# Patient Record
Sex: FEMALE | Race: WHITE | NOT HISPANIC OR LATINO | Employment: OTHER | URBAN - METROPOLITAN AREA
[De-identification: names, ages, dates, MRNs, and addresses within clinical notes are randomized per-mention and may not be internally consistent; named-entity substitution may affect disease eponyms.]

---

## 2023-02-24 PROBLEM — F17.200 SMOKING: Status: ACTIVE | Noted: 2023-02-24

## 2023-02-24 PROBLEM — I44.7 LBBB (LEFT BUNDLE BRANCH BLOCK): Status: ACTIVE | Noted: 2023-02-24

## 2023-02-24 PROBLEM — I21.3 STEMI (ST ELEVATION MYOCARDIAL INFARCTION) (MULTI): Status: ACTIVE | Noted: 2023-02-24

## 2023-02-24 PROBLEM — R05.3 CHRONIC COUGH: Status: ACTIVE | Noted: 2023-02-24

## 2023-02-24 PROBLEM — I51.89 LEFT VENTRICULAR HYPOKINESIS: Status: ACTIVE | Noted: 2023-02-24

## 2023-02-24 PROBLEM — D69.6 THROMBOCYTOPENIA (CMS-HCC): Status: ACTIVE | Noted: 2023-02-24

## 2023-02-24 PROBLEM — R06.02 SHORTNESS OF BREATH: Status: ACTIVE | Noted: 2023-02-24

## 2023-02-24 PROBLEM — I31.39 PERICARDIAL EFFUSION (HHS-HCC): Status: ACTIVE | Noted: 2023-02-24

## 2023-02-24 PROBLEM — E78.5 DYSLIPIDEMIA: Status: ACTIVE | Noted: 2023-02-24

## 2023-02-24 PROBLEM — D72.829 LEUKOCYTOSIS: Status: ACTIVE | Noted: 2023-02-24

## 2023-02-24 PROBLEM — I25.10 CAD (CORONARY ARTERY DISEASE): Status: ACTIVE | Noted: 2023-02-24

## 2023-02-24 PROBLEM — Z95.5 H/O HEART ARTERY STENT: Status: ACTIVE | Noted: 2023-02-24

## 2023-02-24 PROBLEM — I42.0 CARDIOMYOPATHY, DILATED, NONISCHEMIC (MULTI): Status: ACTIVE | Noted: 2023-02-24

## 2023-02-24 PROBLEM — J44.1 COPD WITH ACUTE EXACERBATION (MULTI): Status: ACTIVE | Noted: 2023-02-24

## 2023-02-24 PROBLEM — R10.9 ABDOMINAL PAIN: Status: ACTIVE | Noted: 2023-02-24

## 2023-02-24 PROBLEM — I50.22 CHRONIC SYSTOLIC HEART FAILURE (MULTI): Status: ACTIVE | Noted: 2023-02-24

## 2023-02-24 PROBLEM — I07.1 MODERATE TRICUSPID REGURGITATION: Status: ACTIVE | Noted: 2023-02-24

## 2023-02-24 RX ORDER — MULTIVITAMIN
1 TABLET ORAL DAILY
COMMUNITY

## 2023-02-24 RX ORDER — NITROGLYCERIN 0.4 MG/1
0.4 TABLET SUBLINGUAL EVERY 5 MIN PRN
COMMUNITY
Start: 2016-07-01

## 2023-02-24 RX ORDER — CARVEDILOL 25 MG/1
50 TABLET ORAL 2 TIMES DAILY
COMMUNITY
Start: 2016-10-18

## 2023-02-24 RX ORDER — FUROSEMIDE 20 MG/1
20 TABLET ORAL DAILY PRN
COMMUNITY
End: 2024-03-19 | Stop reason: WASHOUT

## 2023-02-24 RX ORDER — PREDNISONE 20 MG/1
TABLET ORAL
COMMUNITY
End: 2023-04-20 | Stop reason: ALTCHOICE

## 2023-02-24 RX ORDER — ROSUVASTATIN CALCIUM 40 MG/1
1 TABLET, COATED ORAL NIGHTLY
COMMUNITY
Start: 2017-08-09 | End: 2023-06-23 | Stop reason: SDUPTHER

## 2023-02-24 RX ORDER — METFORMIN HYDROCHLORIDE 500 MG/1
500 TABLET ORAL EVERY 12 HOURS
COMMUNITY
Start: 2020-12-04 | End: 2023-10-26 | Stop reason: ALTCHOICE

## 2023-02-24 RX ORDER — FENOFIBRATE 160 MG/1
1 TABLET ORAL DAILY
COMMUNITY
Start: 2022-05-01 | End: 2023-06-23 | Stop reason: SDUPTHER

## 2023-02-24 RX ORDER — ALBUTEROL SULFATE 90 UG/1
1 AEROSOL, METERED RESPIRATORY (INHALATION) EVERY 4 HOURS PRN
COMMUNITY
Start: 2015-11-06

## 2023-02-24 RX ORDER — LISINOPRIL 40 MG/1
1 TABLET ORAL DAILY
COMMUNITY
Start: 2017-01-25

## 2023-02-24 RX ORDER — ASPIRIN 81 MG/1
1 TABLET ORAL DAILY
COMMUNITY
Start: 2016-05-21

## 2023-02-24 RX ORDER — OXYBUTYNIN CHLORIDE 5 MG/1
TABLET ORAL
COMMUNITY
End: 2023-04-20

## 2023-02-24 RX ORDER — PANTOPRAZOLE SODIUM 40 MG/1
40 TABLET, DELAYED RELEASE ORAL DAILY
COMMUNITY
Start: 2016-05-21 | End: 2023-04-24 | Stop reason: SDUPTHER

## 2023-04-20 ENCOUNTER — OFFICE VISIT (OUTPATIENT)
Dept: PRIMARY CARE | Facility: CLINIC | Age: 63
End: 2023-04-20
Payer: COMMERCIAL

## 2023-04-20 VITALS
HEIGHT: 66 IN | HEART RATE: 65 BPM | SYSTOLIC BLOOD PRESSURE: 124 MMHG | RESPIRATION RATE: 16 BRPM | BODY MASS INDEX: 28.64 KG/M2 | OXYGEN SATURATION: 96 % | DIASTOLIC BLOOD PRESSURE: 76 MMHG | WEIGHT: 178.2 LBS

## 2023-04-20 DIAGNOSIS — R73.03 PREDIABETES: ICD-10-CM

## 2023-04-20 DIAGNOSIS — L98.9 CHANGING SKIN LESION: ICD-10-CM

## 2023-04-20 DIAGNOSIS — D69.6 THROMBOCYTOPENIA (CMS-HCC): ICD-10-CM

## 2023-04-20 DIAGNOSIS — Z12.31 VISIT FOR SCREENING MAMMOGRAM: ICD-10-CM

## 2023-04-20 DIAGNOSIS — Z23 NEED FOR PNEUMOCOCCAL VACCINE: ICD-10-CM

## 2023-04-20 DIAGNOSIS — Z12.11 COLON CANCER SCREENING: Primary | ICD-10-CM

## 2023-04-20 DIAGNOSIS — J44.1 COPD WITH ACUTE EXACERBATION (MULTI): ICD-10-CM

## 2023-04-20 DIAGNOSIS — I42.0 CARDIOMYOPATHY, DILATED, NONISCHEMIC (MULTI): ICD-10-CM

## 2023-04-20 DIAGNOSIS — I50.22 CHRONIC SYSTOLIC HEART FAILURE (MULTI): ICD-10-CM

## 2023-04-20 DIAGNOSIS — I25.10 CORONARY ARTERY DISEASE, UNSPECIFIED VESSEL OR LESION TYPE, UNSPECIFIED WHETHER ANGINA PRESENT, UNSPECIFIED WHETHER NATIVE OR TRANSPLANTED HEART: ICD-10-CM

## 2023-04-20 DIAGNOSIS — Z76.89 ENCOUNTER TO ESTABLISH CARE: ICD-10-CM

## 2023-04-20 DIAGNOSIS — I44.7 LBBB (LEFT BUNDLE BRANCH BLOCK): ICD-10-CM

## 2023-04-20 PROCEDURE — 99204 OFFICE O/P NEW MOD 45 MIN: CPT | Performed by: CLINICAL NURSE SPECIALIST

## 2023-04-20 PROCEDURE — 1160F RVW MEDS BY RX/DR IN RCRD: CPT | Performed by: CLINICAL NURSE SPECIALIST

## 2023-04-20 PROCEDURE — 90677 PCV20 VACCINE IM: CPT | Performed by: CLINICAL NURSE SPECIALIST

## 2023-04-20 PROCEDURE — G0009 ADMIN PNEUMOCOCCAL VACCINE: HCPCS | Performed by: CLINICAL NURSE SPECIALIST

## 2023-04-20 PROCEDURE — 1159F MED LIST DOCD IN RCRD: CPT | Performed by: CLINICAL NURSE SPECIALIST

## 2023-04-20 PROCEDURE — 1036F TOBACCO NON-USER: CPT | Performed by: CLINICAL NURSE SPECIALIST

## 2023-04-20 RX ORDER — LANCETS
1 EACH MISCELLANEOUS DAILY
Qty: 100 EACH | Refills: 3 | Status: SHIPPED | OUTPATIENT
Start: 2023-04-20

## 2023-04-20 RX ORDER — DEXTROSE 4 G
1 TABLET,CHEWABLE ORAL DAILY
Qty: 1 EACH | Refills: 0 | Status: SHIPPED | OUTPATIENT
Start: 2023-04-20

## 2023-04-20 ASSESSMENT — ENCOUNTER SYMPTOMS
POLYDIPSIA: 0
SORE THROAT: 0
ABDOMINAL PAIN: 0
BACK PAIN: 0
JOINT SWELLING: 0
TROUBLE SWALLOWING: 0
DYSURIA: 0
CONFUSION: 0
APPETITE CHANGE: 0
ACTIVITY CHANGE: 0
MYALGIAS: 0
HEMATURIA: 0
OCCASIONAL FEELINGS OF UNSTEADINESS: 0
FATIGUE: 0
COUGH: 0
WOUND: 0
DEPRESSION: 0
FEVER: 0
SEIZURES: 0
FLANK PAIN: 0
BRUISES/BLEEDS EASILY: 0
SHORTNESS OF BREATH: 0
DIARRHEA: 0
DIZZINESS: 0
EYE PAIN: 0
NECK PAIN: 0
CONSTIPATION: 0
NAUSEA: 0
CHILLS: 0
CHEST TIGHTNESS: 0
BLOOD IN STOOL: 0
PALPITATIONS: 0
LOSS OF SENSATION IN FEET: 0
HEADACHES: 0
WHEEZING: 0
SLEEP DISTURBANCE: 0
PHOTOPHOBIA: 0
UNEXPECTED WEIGHT CHANGE: 0
VOMITING: 0
ARTHRALGIAS: 0

## 2023-04-20 ASSESSMENT — ANXIETY QUESTIONNAIRES
IF YOU CHECKED OFF ANY PROBLEMS ON THIS QUESTIONNAIRE, HOW DIFFICULT HAVE THESE PROBLEMS MADE IT FOR YOU TO DO YOUR WORK, TAKE CARE OF THINGS AT HOME, OR GET ALONG WITH OTHER PEOPLE: NOT DIFFICULT AT ALL
7. FEELING AFRAID AS IF SOMETHING AWFUL MIGHT HAPPEN: NOT AT ALL
6. BECOMING EASILY ANNOYED OR IRRITABLE: NOT AT ALL
1. FEELING NERVOUS, ANXIOUS, OR ON EDGE: NOT AT ALL
2. NOT BEING ABLE TO STOP OR CONTROL WORRYING: NOT AT ALL
4. TROUBLE RELAXING: NOT AT ALL
GAD7 TOTAL SCORE: 0
5. BEING SO RESTLESS THAT IT IS HARD TO SIT STILL: NOT AT ALL
3. WORRYING TOO MUCH ABOUT DIFFERENT THINGS: NOT AT ALL

## 2023-04-20 ASSESSMENT — PATIENT HEALTH QUESTIONNAIRE - PHQ9
2. FEELING DOWN, DEPRESSED OR HOPELESS: NOT AT ALL
SUM OF ALL RESPONSES TO PHQ9 QUESTIONS 1 AND 2: 0
1. LITTLE INTEREST OR PLEASURE IN DOING THINGS: NOT AT ALL

## 2023-04-20 NOTE — PROGRESS NOTES
Subjective   Patient ID: Sophie Cunningham is a 63 y.o. female who presents for Establish Care.  HPI    New patient here today to establish care.  Previously followed with [facility] in 2018.     Following with Dr. Franklin for Cardiology. [facility]. MI in 2016 with Stent placement. Dyslipidemia, has been taking Simvastatin. Stopped Fenofibrate. Planning to restart.     COPD. Albuterol PRN with Nebulizer solution. Respiratory status at baseline.     Has been diagnosed with Prediabetes. Previously was Metformin, not comfortable with taking medication. Has not been monitoring blood sugars. Does not have the equipment at home.       Review of Systems   Constitutional:  Negative for activity change, appetite change, chills, fatigue, fever and unexpected weight change.   HENT:  Negative for ear pain, hearing loss, nosebleeds, sore throat, tinnitus and trouble swallowing.    Eyes:  Negative for photophobia, pain and visual disturbance.   Respiratory:  Negative for cough, chest tightness, shortness of breath and wheezing.    Cardiovascular:  Negative for chest pain, palpitations and leg swelling.   Gastrointestinal:  Negative for abdominal pain, blood in stool, constipation, diarrhea, nausea and vomiting.   Endocrine: Negative for cold intolerance, heat intolerance, polydipsia and polyuria.   Genitourinary:  Negative for dysuria, flank pain and hematuria.   Musculoskeletal:  Negative for arthralgias, back pain, joint swelling, myalgias and neck pain.   Skin:  Negative for pallor, rash and wound.   Allergic/Immunologic: Negative for immunocompromised state.   Neurological:  Negative for dizziness, seizures and headaches.   Hematological:  Does not bruise/bleed easily.   Psychiatric/Behavioral:  Negative for confusion and sleep disturbance.        Objective   Physical Exam  Vitals and nursing note reviewed.   Constitutional:       General: She is not in acute distress.     Appearance: Normal appearance.    HENT:      Head: Normocephalic.      Nose: Nose normal.   Eyes:      Conjunctiva/sclera: Conjunctivae normal.   Neck:      Vascular: No carotid bruit.   Cardiovascular:      Rate and Rhythm: Normal rate and regular rhythm.      Pulses: Normal pulses.      Heart sounds: Normal heart sounds.   Pulmonary:      Effort: Pulmonary effort is normal.      Breath sounds: Normal breath sounds.   Abdominal:      General: Bowel sounds are normal.      Palpations: Abdomen is soft.   Musculoskeletal:         General: Normal range of motion.      Cervical back: Normal range of motion.   Skin:     General: Skin is warm and dry.   Neurological:      Mental Status: She is alert and oriented to person, place, and time. Mental status is at baseline.   Psychiatric:         Mood and Affect: Mood normal.         Behavior: Behavior normal.         Assessment/Plan        Cardiomyopathy, CAD, Heart Failure, LBBB: Following with Cardiology for management. Stable at this time. Continue to monitor.   Changing Skin Lesion: Dermatology referral.   Dyslipidemia: Continue medication as prescribed.   Thrombocytopenia: Updated lab work ordered.   Prediabetes: Updated A1C ordered.   COPD: Respiratory status at baseline. Continue Albuterol.     Due for Medicare Wellness at follow up appointment.   Declined Colon Cancer Screening.   Mammogram ordered.   Prevnar: April 2023.

## 2023-04-24 DIAGNOSIS — R10.84 GENERALIZED ABDOMINAL PAIN: Primary | ICD-10-CM

## 2023-04-24 RX ORDER — PANTOPRAZOLE SODIUM 40 MG/1
40 TABLET, DELAYED RELEASE ORAL
Qty: 30 TABLET | Refills: 2 | Status: SHIPPED | OUTPATIENT
Start: 2023-04-24 | End: 2023-08-07 | Stop reason: SDUPTHER

## 2023-04-24 NOTE — TELEPHONE ENCOUNTER
Patient called in stating she spoke to her pharmacy and this medication pantoprazole was not sent in I repended it to you.    Silas archibald

## 2023-05-08 ENCOUNTER — LAB (OUTPATIENT)
Dept: LAB | Facility: LAB | Age: 63
End: 2023-05-08
Payer: COMMERCIAL

## 2023-05-08 DIAGNOSIS — L98.9 CHANGING SKIN LESION: ICD-10-CM

## 2023-05-08 DIAGNOSIS — J44.1 COPD WITH ACUTE EXACERBATION (MULTI): ICD-10-CM

## 2023-05-08 DIAGNOSIS — Z12.31 VISIT FOR SCREENING MAMMOGRAM: ICD-10-CM

## 2023-05-08 DIAGNOSIS — Z12.11 COLON CANCER SCREENING: ICD-10-CM

## 2023-05-08 DIAGNOSIS — I50.22 CHRONIC SYSTOLIC HEART FAILURE (MULTI): ICD-10-CM

## 2023-05-08 DIAGNOSIS — I25.10 CORONARY ARTERY DISEASE, UNSPECIFIED VESSEL OR LESION TYPE, UNSPECIFIED WHETHER ANGINA PRESENT, UNSPECIFIED WHETHER NATIVE OR TRANSPLANTED HEART: ICD-10-CM

## 2023-05-08 DIAGNOSIS — R73.03 PREDIABETES: ICD-10-CM

## 2023-05-08 DIAGNOSIS — D69.6 THROMBOCYTOPENIA (CMS-HCC): ICD-10-CM

## 2023-05-08 DIAGNOSIS — I42.0 CARDIOMYOPATHY, DILATED, NONISCHEMIC (MULTI): ICD-10-CM

## 2023-05-08 LAB
ALANINE AMINOTRANSFERASE (SGPT) (U/L) IN SER/PLAS: 17 U/L (ref 7–45)
ALANINE AMINOTRANSFERASE (SGPT) (U/L) IN SER/PLAS: 17 U/L (ref 7–45)
ALBUMIN (G/DL) IN SER/PLAS: 3.8 G/DL (ref 3.4–5)
ALBUMIN (G/DL) IN SER/PLAS: 3.8 G/DL (ref 3.4–5)
ALKALINE PHOSPHATASE (U/L) IN SER/PLAS: 43 U/L (ref 33–136)
ALKALINE PHOSPHATASE (U/L) IN SER/PLAS: 43 U/L (ref 33–136)
ANION GAP IN SER/PLAS: 9 MMOL/L (ref 10–20)
ANION GAP IN SER/PLAS: 9 MMOL/L (ref 10–20)
ASPARTATE AMINOTRANSFERASE (SGOT) (U/L) IN SER/PLAS: 18 U/L (ref 9–39)
ASPARTATE AMINOTRANSFERASE (SGOT) (U/L) IN SER/PLAS: 18 U/L (ref 9–39)
BILIRUBIN TOTAL (MG/DL) IN SER/PLAS: 0.7 MG/DL (ref 0–1.2)
BILIRUBIN TOTAL (MG/DL) IN SER/PLAS: 0.7 MG/DL (ref 0–1.2)
CALCIUM (MG/DL) IN SER/PLAS: 9 MG/DL (ref 8.6–10.3)
CALCIUM (MG/DL) IN SER/PLAS: 9.2 MG/DL (ref 8.6–10.3)
CARBON DIOXIDE, TOTAL (MMOL/L) IN SER/PLAS: 28 MMOL/L (ref 21–32)
CARBON DIOXIDE, TOTAL (MMOL/L) IN SER/PLAS: 28 MMOL/L (ref 21–32)
CHLORIDE (MMOL/L) IN SER/PLAS: 111 MMOL/L (ref 98–107)
CHLORIDE (MMOL/L) IN SER/PLAS: 112 MMOL/L (ref 98–107)
CHOLESTEROL (MG/DL) IN SER/PLAS: 212 MG/DL (ref 0–199)
CHOLESTEROL IN HDL (MG/DL) IN SER/PLAS: 37.5 MG/DL
CHOLESTEROL/HDL RATIO: 5.7
COBALAMIN (VITAMIN B12) (PG/ML) IN SER/PLAS: 176 PG/ML (ref 211–911)
CREATININE (MG/DL) IN SER/PLAS: 0.8 MG/DL (ref 0.5–1.05)
CREATININE (MG/DL) IN SER/PLAS: 0.81 MG/DL (ref 0.5–1.05)
ERYTHROCYTE DISTRIBUTION WIDTH (RATIO) BY AUTOMATED COUNT: 13.9 % (ref 11.5–14.5)
ERYTHROCYTE MEAN CORPUSCULAR HEMOGLOBIN CONCENTRATION (G/DL) BY AUTOMATED: 31.8 G/DL (ref 32–36)
ERYTHROCYTE MEAN CORPUSCULAR VOLUME (FL) BY AUTOMATED COUNT: 93 FL (ref 80–100)
ERYTHROCYTES (10*6/UL) IN BLOOD BY AUTOMATED COUNT: 4.33 X10E12/L (ref 4–5.2)
ESTIMATED AVERAGE GLUCOSE FOR HBA1C: 143 MG/DL
GFR FEMALE: 80 ML/MIN/1.73M2
GFR FEMALE: 82 ML/MIN/1.73M2
GLUCOSE (MG/DL) IN SER/PLAS: 95 MG/DL (ref 74–99)
GLUCOSE (MG/DL) IN SER/PLAS: 96 MG/DL (ref 74–99)
HEMATOCRIT (%) IN BLOOD BY AUTOMATED COUNT: 40.2 % (ref 36–46)
HEMOGLOBIN (G/DL) IN BLOOD: 12.8 G/DL (ref 12–16)
HEMOGLOBIN A1C/HEMOGLOBIN TOTAL IN BLOOD: 6.6 %
LDL: 137 MG/DL (ref 0–99)
LEUKOCYTES (10*3/UL) IN BLOOD BY AUTOMATED COUNT: 9.3 X10E9/L (ref 4.4–11.3)
PLATELETS (10*3/UL) IN BLOOD AUTOMATED COUNT: 168 X10E9/L (ref 150–450)
POTASSIUM (MMOL/L) IN SER/PLAS: 3.9 MMOL/L (ref 3.5–5.3)
POTASSIUM (MMOL/L) IN SER/PLAS: 4.3 MMOL/L (ref 3.5–5.3)
PROTEIN TOTAL: 6 G/DL (ref 6.4–8.2)
PROTEIN TOTAL: 6.3 G/DL (ref 6.4–8.2)
SODIUM (MMOL/L) IN SER/PLAS: 144 MMOL/L (ref 136–145)
SODIUM (MMOL/L) IN SER/PLAS: 145 MMOL/L (ref 136–145)
THYROTROPIN (MIU/L) IN SER/PLAS BY DETECTION LIMIT <= 0.05 MIU/L: 1.19 MIU/L (ref 0.44–3.98)
TRIGLYCERIDE (MG/DL) IN SER/PLAS: 189 MG/DL (ref 0–149)
UREA NITROGEN (MG/DL) IN SER/PLAS: 12 MG/DL (ref 6–23)
UREA NITROGEN (MG/DL) IN SER/PLAS: 12 MG/DL (ref 6–23)
VLDL: 38 MG/DL (ref 0–40)

## 2023-05-08 PROCEDURE — 85027 COMPLETE CBC AUTOMATED: CPT

## 2023-05-08 PROCEDURE — 36415 COLL VENOUS BLD VENIPUNCTURE: CPT

## 2023-05-08 PROCEDURE — 84443 ASSAY THYROID STIM HORMONE: CPT

## 2023-05-08 PROCEDURE — 80053 COMPREHEN METABOLIC PANEL: CPT

## 2023-05-08 PROCEDURE — 82607 VITAMIN B-12: CPT

## 2023-05-08 PROCEDURE — 83036 HEMOGLOBIN GLYCOSYLATED A1C: CPT

## 2023-05-09 ENCOUNTER — TELEPHONE (OUTPATIENT)
Dept: PRIMARY CARE | Facility: CLINIC | Age: 63
End: 2023-05-09
Payer: MEDICAID

## 2023-05-09 DIAGNOSIS — E11.9 TYPE 2 DIABETES MELLITUS WITHOUT COMPLICATION, WITHOUT LONG-TERM CURRENT USE OF INSULIN (MULTI): ICD-10-CM

## 2023-05-09 DIAGNOSIS — E53.8 VITAMIN B12 DEFICIENCY: ICD-10-CM

## 2023-05-09 NOTE — TELEPHONE ENCOUNTER
----- Message from SUNI Soto-CNS sent at 5/9/2023 10:24 AM EDT -----  Please let patient know that her A1C is 6.6%. Indicates Diabetes, diet controlled. Vitamin B12 is low, recommend Vitamin B12. Repeat CMP, A1C, Vitamin B12 with an OV in 4 months. Thank you!

## 2023-06-23 ENCOUNTER — TELEPHONE (OUTPATIENT)
Dept: PRIMARY CARE | Facility: CLINIC | Age: 63
End: 2023-06-23
Payer: MEDICAID

## 2023-06-23 DIAGNOSIS — E78.5 DYSLIPIDEMIA: ICD-10-CM

## 2023-06-23 RX ORDER — FENOFIBRATE 160 MG/1
160 TABLET ORAL DAILY
Qty: 90 TABLET | Refills: 1 | Status: SHIPPED | OUTPATIENT
Start: 2023-06-23 | End: 2024-04-25 | Stop reason: ALTCHOICE

## 2023-06-23 RX ORDER — ROSUVASTATIN CALCIUM 40 MG/1
40 TABLET, COATED ORAL NIGHTLY
Qty: 90 TABLET | Refills: 1 | Status: SHIPPED | OUTPATIENT
Start: 2023-06-23 | End: 2024-04-25 | Stop reason: SDUPTHER

## 2023-06-23 NOTE — TELEPHONE ENCOUNTER
Refill on Fenofibrate 160 mg  1 tablet daily & Rosuvastatin 40 mg  1 tablet daily    #90  on each to Silas Phillips

## 2023-08-07 DIAGNOSIS — R10.84 GENERALIZED ABDOMINAL PAIN: ICD-10-CM

## 2023-08-07 RX ORDER — PANTOPRAZOLE SODIUM 40 MG/1
40 TABLET, DELAYED RELEASE ORAL
Qty: 90 TABLET | Refills: 1 | Status: SHIPPED | OUTPATIENT
Start: 2023-08-07 | End: 2024-02-12 | Stop reason: SDUPTHER

## 2023-09-13 ENCOUNTER — APPOINTMENT (OUTPATIENT)
Dept: PRIMARY CARE | Facility: CLINIC | Age: 63
End: 2023-09-13
Payer: MEDICAID

## 2023-09-13 ENCOUNTER — TELEPHONE (OUTPATIENT)
Dept: PRIMARY CARE | Facility: CLINIC | Age: 63
End: 2023-09-13

## 2023-09-13 NOTE — TELEPHONE ENCOUNTER
Patient canceled todays appt due to her daughter having surgery , she has an appt in October  for MCR wellness exam Do you need to see her earlier than that, Please advise  Please call patient back [phone]

## 2023-10-26 ENCOUNTER — OFFICE VISIT (OUTPATIENT)
Dept: PRIMARY CARE | Facility: CLINIC | Age: 63
End: 2023-10-26
Payer: COMMERCIAL

## 2023-10-26 VITALS
DIASTOLIC BLOOD PRESSURE: 68 MMHG | SYSTOLIC BLOOD PRESSURE: 110 MMHG | WEIGHT: 181 LBS | HEART RATE: 71 BPM | HEIGHT: 66 IN | BODY MASS INDEX: 29.09 KG/M2

## 2023-10-26 DIAGNOSIS — Z00.00 MEDICARE ANNUAL WELLNESS VISIT, SUBSEQUENT: Primary | ICD-10-CM

## 2023-10-26 DIAGNOSIS — I25.10 CORONARY ARTERY DISEASE, UNSPECIFIED VESSEL OR LESION TYPE, UNSPECIFIED WHETHER ANGINA PRESENT, UNSPECIFIED WHETHER NATIVE OR TRANSPLANTED HEART: ICD-10-CM

## 2023-10-26 DIAGNOSIS — Z12.31 VISIT FOR SCREENING MAMMOGRAM: ICD-10-CM

## 2023-10-26 DIAGNOSIS — Z12.11 COLON CANCER SCREENING: ICD-10-CM

## 2023-10-26 DIAGNOSIS — J44.9 CHRONIC OBSTRUCTIVE PULMONARY DISEASE, UNSPECIFIED COPD TYPE (MULTI): ICD-10-CM

## 2023-10-26 DIAGNOSIS — D69.6 THROMBOCYTOPENIA (CMS-HCC): ICD-10-CM

## 2023-10-26 DIAGNOSIS — R73.03 PREDIABETES: ICD-10-CM

## 2023-10-26 DIAGNOSIS — I50.22 CHRONIC SYSTOLIC HEART FAILURE (MULTI): ICD-10-CM

## 2023-10-26 DIAGNOSIS — E11.9 DIABETES MELLITUS WITHOUT COMPLICATION (MULTI): ICD-10-CM

## 2023-10-26 DIAGNOSIS — Z78.0 POST-MENOPAUSAL: ICD-10-CM

## 2023-10-26 DIAGNOSIS — I42.0 CARDIOMYOPATHY, DILATED, NONISCHEMIC (MULTI): ICD-10-CM

## 2023-10-26 DIAGNOSIS — L98.9 CHANGING SKIN LESION: ICD-10-CM

## 2023-10-26 PROBLEM — L57.0 ACTINIC KERATOSIS: Status: ACTIVE | Noted: 2020-12-10

## 2023-10-26 PROBLEM — L85.3 XEROSIS CUTIS: Status: ACTIVE | Noted: 2020-12-10

## 2023-10-26 PROBLEM — I25.2 HISTORY OF ST ELEVATION MYOCARDIAL INFARCTION (STEMI): Status: ACTIVE | Noted: 2023-10-26

## 2023-10-26 PROBLEM — D18.01 HEMANGIOMA OF SKIN AND SUBCUTANEOUS TISSUE: Status: ACTIVE | Noted: 2020-12-10

## 2023-10-26 PROBLEM — L82.1 OTHER SEBORRHEIC KERATOSIS: Status: ACTIVE | Noted: 2020-12-10

## 2023-10-26 PROBLEM — C44.41 BASAL CELL CARCINOMA OF SKIN OF SCALP AND NECK: Status: ACTIVE | Noted: 2020-12-10

## 2023-10-26 PROBLEM — C44.612 BASAL CELL CARCINOMA OF SKIN OF RIGHT UPPER LIMB, INCLUDING SHOULDER: Status: ACTIVE | Noted: 2020-12-10

## 2023-10-26 PROBLEM — L57.9 SKIN CHANGES DUE TO CHRONIC EXPOSURE TO NONIONIZING RADIATION, UNSPECIFIED: Status: ACTIVE | Noted: 2020-12-10

## 2023-10-26 PROBLEM — I34.0 MITRAL REGURGITATION: Status: ACTIVE | Noted: 2023-10-26

## 2023-10-26 PROBLEM — D48.5 NEOPLASM OF UNCERTAIN BEHAVIOR OF SKIN: Status: ACTIVE | Noted: 2020-12-10

## 2023-10-26 PROCEDURE — 1036F TOBACCO NON-USER: CPT | Performed by: CLINICAL NURSE SPECIALIST

## 2023-10-26 PROCEDURE — 3074F SYST BP LT 130 MM HG: CPT | Performed by: CLINICAL NURSE SPECIALIST

## 2023-10-26 PROCEDURE — 4010F ACE/ARB THERAPY RXD/TAKEN: CPT | Performed by: CLINICAL NURSE SPECIALIST

## 2023-10-26 PROCEDURE — 1159F MED LIST DOCD IN RCRD: CPT | Performed by: CLINICAL NURSE SPECIALIST

## 2023-10-26 PROCEDURE — 3078F DIAST BP <80 MM HG: CPT | Performed by: CLINICAL NURSE SPECIALIST

## 2023-10-26 PROCEDURE — G0444 DEPRESSION SCREEN ANNUAL: HCPCS | Performed by: CLINICAL NURSE SPECIALIST

## 2023-10-26 PROCEDURE — 1170F FXNL STATUS ASSESSED: CPT | Performed by: CLINICAL NURSE SPECIALIST

## 2023-10-26 PROCEDURE — 1160F RVW MEDS BY RX/DR IN RCRD: CPT | Performed by: CLINICAL NURSE SPECIALIST

## 2023-10-26 PROCEDURE — 3044F HG A1C LEVEL LT 7.0%: CPT | Performed by: CLINICAL NURSE SPECIALIST

## 2023-10-26 PROCEDURE — G0439 PPPS, SUBSEQ VISIT: HCPCS | Performed by: CLINICAL NURSE SPECIALIST

## 2023-10-26 PROCEDURE — 99214 OFFICE O/P EST MOD 30 MIN: CPT | Performed by: CLINICAL NURSE SPECIALIST

## 2023-10-26 ASSESSMENT — ENCOUNTER SYMPTOMS
BACK PAIN: 0
CONSTIPATION: 0
JOINT SWELLING: 0
ABDOMINAL PAIN: 0
MYALGIAS: 0
WHEEZING: 0
NECK PAIN: 0
WOUND: 0
PHOTOPHOBIA: 0
FLANK PAIN: 0
OCCASIONAL FEELINGS OF UNSTEADINESS: 0
UNEXPECTED WEIGHT CHANGE: 0
TROUBLE SWALLOWING: 0
PALPITATIONS: 0
LOSS OF SENSATION IN FEET: 0
BLOOD IN STOOL: 0
SEIZURES: 0
FEVER: 0
HEMATURIA: 0
SLEEP DISTURBANCE: 0
DIZZINESS: 0
ARTHRALGIAS: 0
FATIGUE: 0
CONFUSION: 0
ACTIVITY CHANGE: 0
CHEST TIGHTNESS: 0
NAUSEA: 0
VOMITING: 0
EYE PAIN: 0
SHORTNESS OF BREATH: 0
SORE THROAT: 0
BRUISES/BLEEDS EASILY: 0
CHILLS: 0
COUGH: 0
HEADACHES: 0
DEPRESSION: 0
DYSURIA: 0
POLYDIPSIA: 0
APPETITE CHANGE: 0
DIARRHEA: 0

## 2023-10-26 ASSESSMENT — COLUMBIA-SUICIDE SEVERITY RATING SCALE - C-SSRS
1. IN THE PAST MONTH, HAVE YOU WISHED YOU WERE DEAD OR WISHED YOU COULD GO TO SLEEP AND NOT WAKE UP?: NO
6. HAVE YOU EVER DONE ANYTHING, STARTED TO DO ANYTHING, OR PREPARED TO DO ANYTHING TO END YOUR LIFE?: NO
2. HAVE YOU ACTUALLY HAD ANY THOUGHTS OF KILLING YOURSELF?: NO

## 2023-10-26 ASSESSMENT — PATIENT HEALTH QUESTIONNAIRE - PHQ9
SUM OF ALL RESPONSES TO PHQ9 QUESTIONS 1 AND 2: 0
2. FEELING DOWN, DEPRESSED OR HOPELESS: NOT AT ALL
1. LITTLE INTEREST OR PLEASURE IN DOING THINGS: NOT AT ALL

## 2023-10-26 ASSESSMENT — ACTIVITIES OF DAILY LIVING (ADL)
MANAGING_FINANCES: INDEPENDENT
BATHING: INDEPENDENT
GROCERY_SHOPPING: INDEPENDENT
DRESSING: INDEPENDENT
TAKING_MEDICATION: INDEPENDENT
DOING_HOUSEWORK: INDEPENDENT

## 2023-10-26 NOTE — PROGRESS NOTES
Subjective   Reason for Visit: Sophie Cunningham is an 63 y.o. female here for a Medicare Wellness visit.     Past Medical, Surgical, and Family History reviewed and updated in chart.    Reviewed all medications by prescribing practitioner or clinical pharmacist (such as prescriptions, OTCs, herbal therapies and supplements) and documented in the medical record.    HPI    Here today as a follow up appointment. Due for Medicare Wellness.      Following with Dr. Franklin for Cardiology. [facility]. MI in 2016 with Stent placement. Dyslipidemia, has been taking Simvastatin and Fenofibrate.      COPD. Albuterol PRN with Nebulizer solution. Respiratory status at baseline.      Has been diagnosed with Diabetes. Previously was Metformin, not comfortable with taking medication. Has been monitoring blood sugars. 30 day average 112. 90 day average 108.        Patient Care Team:  JAMEL Soto as PCP - General (Internal Medicine)  JAMEL Soto as PCP - [employer] PCP     Review of Systems   Constitutional:  Negative for activity change, appetite change, chills, fatigue, fever and unexpected weight change.   HENT:  Negative for ear pain, hearing loss, nosebleeds, sore throat, tinnitus and trouble swallowing.    Eyes:  Negative for photophobia, pain and visual disturbance.   Respiratory:  Negative for cough, chest tightness, shortness of breath and wheezing.    Cardiovascular:  Negative for chest pain, palpitations and leg swelling.   Gastrointestinal:  Negative for abdominal pain, blood in stool, constipation, diarrhea, nausea and vomiting.   Endocrine: Negative for cold intolerance, heat intolerance, polydipsia and polyuria.   Genitourinary:  Negative for dysuria, flank pain and hematuria.   Musculoskeletal:  Negative for arthralgias, back pain, joint swelling, myalgias and neck pain.   Skin:  Negative for pallor, rash and wound.   Allergic/Immunologic: Negative for  "immunocompromised state.   Neurological:  Negative for dizziness, seizures and headaches.   Hematological:  Does not bruise/bleed easily.   Psychiatric/Behavioral:  Negative for confusion and sleep disturbance.        Objective   Vitals:  BP 80/54 (BP Location: Right arm, Patient Position: Sitting)   Pulse 71   Ht 1.664 m (5' 5.5\")   Wt 82.1 kg (181 lb)   BMI 29.66 kg/m²       Physical Exam  Vitals and nursing note reviewed.   Constitutional:       General: She is not in acute distress.     Appearance: Normal appearance.   HENT:      Head: Normocephalic.      Nose: Nose normal.   Eyes:      Conjunctiva/sclera: Conjunctivae normal.   Neck:      Vascular: No carotid bruit.   Cardiovascular:      Rate and Rhythm: Normal rate and regular rhythm.      Pulses: Normal pulses.      Heart sounds: Normal heart sounds.   Pulmonary:      Effort: Pulmonary effort is normal.      Breath sounds: Normal breath sounds.   Abdominal:      General: Bowel sounds are normal.      Palpations: Abdomen is soft.   Musculoskeletal:         General: Normal range of motion.      Cervical back: Normal range of motion.   Skin:     General: Skin is warm and dry.   Neurological:      Mental Status: She is alert and oriented to person, place, and time. Mental status is at baseline.   Psychiatric:         Mood and Affect: Mood normal.         Behavior: Behavior normal.       Assessment/Plan   Problem List Items Addressed This Visit       CAD (coronary artery disease)    Cardiomyopathy, dilated, nonischemic (CMS/HCC)    Chronic systolic heart failure (CMS/HCC)    COPD with acute exacerbation (CMS/HCC)    Thrombocytopenia (CMS/HCC)    Prediabetes     Other Visit Diagnoses       Colon cancer screening        Visit for screening mammogram        Changing skin lesion            Reviewed most recent results with patient.       Cardiomyopathy, CAD, Heart Failure, LBBB: Following with Cardiology for management. Stable at this time. Continue to monitor. "   Changing Skin Lesion: Dermatology referral. Has not followed up at this time.   Dyslipidemia: Continue medication as prescribed.   Thrombocytopenia: Updated lab work ordered.   Diabetes: A1C 6.6%. Discussed continued lifestyle modifications. Encourage updated eye exams and Podiatry.   COPD: Respiratory status at baseline. Continue Albuterol.   Medicare Wellness: Routine and age appropriate recommendations discussed with the patient today and patient verbalized understanding of the recommendations.  Questions answered.  Age appropriate immunizations and preventative screenings discussed with the patient and ordered as appropriate. Labs updated and ordered as indicated. Recommend healthy diet and daily exercise to maintain healthy body weight.      Medicare Wellness: October 2023.   Declined Colon Cancer Screening.   Mammogram ordered.   Prevnar: April 2023.   Declined Flu Vaccine.

## 2024-01-11 ENCOUNTER — HOSPITAL ENCOUNTER (OUTPATIENT)
Dept: CARDIOLOGY | Facility: HOSPITAL | Age: 64
Discharge: HOME | End: 2024-01-11
Payer: MEDICARE

## 2024-01-11 DIAGNOSIS — I42.0 DILATED CARDIOMYOPATHY (MULTI): ICD-10-CM

## 2024-01-11 DIAGNOSIS — Z95.810 PRESENCE OF AUTOMATIC (IMPLANTABLE) CARDIAC DEFIBRILLATOR: ICD-10-CM

## 2024-01-11 DIAGNOSIS — Z95.810 PRESENCE OF AUTOMATIC (IMPLANTABLE) CARDIAC DEFIBRILLATOR: Primary | ICD-10-CM

## 2024-01-11 PROCEDURE — 93284 PRGRMG EVAL IMPLANTABLE DFB: CPT

## 2024-01-11 PROCEDURE — 93290 INTERROG DEV EVAL ICPMS IP: CPT | Performed by: INTERNAL MEDICINE

## 2024-01-11 PROCEDURE — 93284 PRGRMG EVAL IMPLANTABLE DFB: CPT | Performed by: INTERNAL MEDICINE

## 2024-02-12 ENCOUNTER — TELEPHONE (OUTPATIENT)
Dept: PRIMARY CARE | Facility: CLINIC | Age: 64
End: 2024-02-12
Payer: MEDICARE

## 2024-02-12 DIAGNOSIS — R10.84 GENERALIZED ABDOMINAL PAIN: ICD-10-CM

## 2024-02-12 RX ORDER — PANTOPRAZOLE SODIUM 40 MG/1
40 TABLET, DELAYED RELEASE ORAL
Qty: 90 TABLET | Refills: 3 | Status: SHIPPED | OUTPATIENT
Start: 2024-02-12 | End: 2025-02-06

## 2024-02-14 ENCOUNTER — HOSPITAL ENCOUNTER (OUTPATIENT)
Dept: CARDIOLOGY | Facility: HOSPITAL | Age: 64
Discharge: HOME | End: 2024-02-14
Payer: MEDICARE

## 2024-02-14 DIAGNOSIS — I34.0 NONRHEUMATIC MITRAL (VALVE) INSUFFICIENCY: ICD-10-CM

## 2024-02-14 DIAGNOSIS — I07.1 RHEUMATIC TRICUSPID INSUFFICIENCY: ICD-10-CM

## 2024-02-14 DIAGNOSIS — I50.22 CHRONIC SYSTOLIC (CONGESTIVE) HEART FAILURE (MULTI): ICD-10-CM

## 2024-02-14 PROCEDURE — 93306 TTE W/DOPPLER COMPLETE: CPT

## 2024-02-14 PROCEDURE — 2500000004 HC RX 250 GENERAL PHARMACY W/ HCPCS (ALT 636 FOR OP/ED): Performed by: INTERNAL MEDICINE

## 2024-02-14 PROCEDURE — 93306 TTE W/DOPPLER COMPLETE: CPT | Performed by: INTERNAL MEDICINE

## 2024-02-14 RX ADMIN — HUMAN ALBUMIN MICROSPHERES AND PERFLUTREN 0.5 ML: 10; .22 INJECTION, SOLUTION INTRAVENOUS at 14:05

## 2024-02-15 LAB
AORTIC VALVE MEAN GRADIENT: 3 MMHG
AVA (VTI): 2.13 CM2
EJECTION FRACTION APICAL 4 CHAMBER: 57.7
EJECTION FRACTION: 56 %
LEFT ATRIUM VOLUME AREA LENGTH INDEX BSA: 21.1 ML/M2
LEFT VENTRICLE INTERNAL DIMENSION DIASTOLE: 4.4 CM (ref 3.5–6)
LEFT VENTRICULAR OUTFLOW TRACT DIAMETER: 2 CM
MITRAL VALVE E/A RATIO: 0.53
MITRAL VALVE E/E' RATIO: 8.82
RIGHT VENTRICLE FREE WALL PEAK S': 10.3 CM/S
RIGHT VENTRICLE PEAK SYSTOLIC PRESSURE: 23.3 MMHG
TRICUSPID ANNULAR PLANE SYSTOLIC EXCURSION: 2.1 CM

## 2024-02-26 ENCOUNTER — TELEPHONE (OUTPATIENT)
Dept: PRIMARY CARE | Facility: CLINIC | Age: 64
End: 2024-02-26
Payer: MEDICARE

## 2024-02-26 NOTE — TELEPHONE ENCOUNTER
Called patient phone rang and rang. I called  to see if I needed to fax something over to them. I have no order or referral for Derm/

## 2024-03-01 ENCOUNTER — TELEPHONE (OUTPATIENT)
Dept: DERMATOLOGY | Facility: CLINIC | Age: 64
End: 2024-03-01
Payer: MEDICARE

## 2024-03-01 NOTE — TELEPHONE ENCOUNTER
Pt LM wanting biopsy report to be faxe to [facility] fax # [phone].. I returned call to pt to make her aware that bx was taken 07/14/2020 AK rt lat arm SCCIS rt lat distal arm , she needs to sign a release , I recommended pt go on visit to see dermatologist at [facility] and sign release from their office for her records ... Pt lives far from our office to come in and sign release ..pt Sophie aware of this today and is ok with message

## 2024-03-11 PROBLEM — R53.83 FATIGUE: Status: ACTIVE | Noted: 2024-03-11

## 2024-03-11 PROBLEM — J20.9 ACUTE BRONCHITIS: Status: ACTIVE | Noted: 2024-03-11

## 2024-03-11 PROBLEM — R91.8 MULTIPLE PULMONARY NODULES: Status: ACTIVE | Noted: 2024-03-11

## 2024-03-11 PROBLEM — K21.9 GASTROESOPHAGEAL REFLUX DISEASE: Status: ACTIVE | Noted: 2024-03-11

## 2024-03-11 PROBLEM — I25.10 ARTERIOSCLEROSIS OF CORONARY ARTERY: Status: ACTIVE | Noted: 2018-09-19

## 2024-03-11 PROBLEM — R50.9 FEVER: Status: ACTIVE | Noted: 2024-03-11

## 2024-03-11 PROBLEM — D69.6 LOW PLATELET COUNT (CMS-HCC): Status: ACTIVE | Noted: 2023-02-24

## 2024-03-18 NOTE — PROGRESS NOTES
"Counseling:  The patient was counseled regarding diagnostic results, instructions for management, risk factor reductions, prognosis, patient and family education, impressions, risks and benefits of treatment options and importance of compliance with treatment.      Chief Complaint:   The patient presents today for 6-month followup of CMP/heart failure, CAD, mitral and tricuspid regurgitation, and dyslipidemia s/p echocardiogram.      History Of Present Illness:    Sophie Cunningham is a 64 year old female patient who presents today for 6-month followup of CMP/heart failure, CAD, mitral and tricuspid regurgitation, and dyslipidemia s/p echocardiogram. Her PMH is significant for CAD s/p PCI of OM1 in 2016 (appears to have presented as STEMI per chart review), nonischemic cardiomyopathy, LBBB, chronic systolic heart failure s/p Bi-V ICD in 2016, hyperlipidemia, prediabetes, thrombocytopenia and nicotine dependence. Echocardiogram performed 02/14/2024 demonstrated an EF of 45-50%, aneurysmal mid inferolateral wall, moderate tricuspid regurgitation and mild mitral regurgitation. Over the past 6 months, the patient states that she has done well from a cardiac standpoint. She denies any CP, chest discomfort or SOB. Unfortunately, the patient continues to smoke, although at reduced quantity. EKG today is stable with no acute changes. The patient is compliant with her prescribed medications.     Last Recorded Vitals:  Vitals:    03/19/24 1109 03/19/24 1123   BP: 96/60 94/60   BP Location: Left arm    Pulse: 64    Weight: 81.6 kg (180 lb)    Height: 1.676 m (5' 6\")        Past Medical History:  She has a past medical history of Acute upper respiratory infection, unspecified (03/29/2019), Cardiac tamponade, Chronic obstructive pulmonary disease with (acute) exacerbation (CMS/[facility]) (05/14/2018), Chronic sinusitis, unspecified (01/30/2017), Disorder of the skin and subcutaneous tissue, unspecified (09/06/2017), Personal history of " other diseases of the digestive system (07/14/2017), Personal history of other diseases of the respiratory system (03/29/2019), Personal history of other medical treatment (01/13/2017), Personal history of other medical treatment, Personal history of other specified conditions (10/21/2016), Personal history of other specified conditions (03/29/2019), and Personal history of other specified conditions (03/29/2019).    Past Surgical History:  She has a past surgical history that includes Cervical biopsy w/ loop electrode excision (06/01/2016); Other surgical history (06/01/2016); Other surgical history (10/18/2016); and Cardiac pacemaker placement (10/12/2016).      Social History:  She reports that she has been smoking cigarettes. She has been smoking an average of .25 packs per day. She has never used smokeless tobacco. She reports that she does not currently use alcohol. She reports current drug use. Drug: Marijuana.    Family History:  Family History   Problem Relation Name Age of Onset    Pneumonia Mother      Alcohol abuse Father      Liver disease Father      Thyroid disease Sister      Throat cancer Brother      Coronary artery disease Mother's Brother      Coronary artery disease Maternal Grandfather      Alcohol abuse Paternal Grandfather      Heart attack Other Grandfather         Allergies:  Codeine    Outpatient Medications:  Current Outpatient Medications   Medication Instructions    albuterol 90 mcg/actuation inhaler 1 puff, inhalation, Every 4 hours PRN    albuterol 2.5 mg, nebulization, Every 6 hours PRN    aspirin 81 mg EC tablet 1 tablet, oral, Daily    blood sugar diagnostic strip 1 strip, miscellaneous, Daily, Give what is covered by insurance    blood-glucose meter misc 1 each, miscellaneous, Daily, Give what is covered by insurance    budesonide-formoteroL (Symbicort) 160-4.5 mcg/actuation inhaler inhalation, Every 12 hours    carvedilol (COREG) 50 mg, oral, 2 times daily, COREG 25 MG TABEQUIV     fenofibrate (TRIGLIDE) 160 mg, oral, Daily    furosemide (LASIX) 20 mg, oral, Daily PRN    lancets misc 1 Lancet, subcutaneous, Daily, Give what is covered by insurance    lisinopril 40 mg tablet 1 tablet, oral, Daily    multivitamin tablet 1 tablet, oral, Daily    nitroglycerin (NITROSTAT) 0.4 mg, sublingual, Every 5 min PRN    pantoprazole (PROTONIX) 40 mg, oral, Daily before breakfast, Protonix 40 mg tab equiv    rosuvastatin (CRESTOR) 40 mg, oral, Nightly     Review of Systems   All other systems reviewed and are negative.     Physical Exam:  Constitutional:       Appearance: Healthy appearance. Not in distress.   Neck:      Vascular: No JVR. JVD normal.   Pulmonary:      Effort: Pulmonary effort is normal.      Breath sounds: Normal breath sounds. No wheezing. No rhonchi. No rales.   Chest:      Chest wall: Not tender to palpatation.   Cardiovascular:      PMI at left midclavicular line. Normal rate. Regular rhythm. Normal S1. Normal S2.       Murmurs: There is no murmur.      No gallop.  No click. No rub.   Pulses:     Intact distal pulses.   Edema:     Peripheral edema absent.   Abdominal:      General: Bowel sounds are normal.      Palpations: Abdomen is soft.      Tenderness: There is no abdominal tenderness.   Musculoskeletal: Normal range of motion.         General: No tenderness. Skin:     General: Skin is warm and dry.   Neurological:      General: No focal deficit present.      Mental Status: Alert and oriented to person, place and time.          Last Labs:  CBC -  Lab Results   Component Value Date    WBC 9.3 05/08/2023    HGB 12.8 05/08/2023    HCT 40.2 05/08/2023    MCV 93 05/08/2023     05/08/2023       CMP -  Lab Results   Component Value Date    CALCIUM 9.2 05/08/2023    PROT 6.0 (L) 05/08/2023    ALBUMIN 3.8 05/08/2023    AST 18 05/08/2023    ALT 17 05/08/2023    ALKPHOS 43 05/08/2023    BILITOT 0.7 05/08/2023       LIPID PANEL -   Lab Results   Component Value Date    CHOL 212 (H)  05/08/2023    TRIG 189 (H) 05/08/2023    HDL 37.5 (A) 05/08/2023    CHHDL 5.7 (A) 05/08/2023    LDLF 137 (H) 05/08/2023    VLDL 38 05/08/2023    NHDL 102 11/05/2018       RENAL FUNCTION PANEL -   Lab Results   Component Value Date    GLUCOSE 95 05/08/2023     05/08/2023    K 4.3 05/08/2023     (H) 05/08/2023    CO2 28 05/08/2023    ANIONGAP 9 (L) 05/08/2023    BUN 12 05/08/2023    CREATININE 0.80 05/08/2023    CALCIUM 9.2 05/08/2023    ALBUMIN 3.8 05/08/2023        Lab Results   Component Value Date    HGBA1C 6.6 (A) 05/08/2023       Last Cardiology Tests:  02/14/2024 - TTE  1. Left ventricular systolic function is mildly decreased with a 45-50% estimated ejection fraction.  2. Mid inferolateral segment is abnormal.  3. Aneurysmal mid inferolateral wall.  4. Spectral Doppler shows an impaired relaxation pattern of left ventricular diastolic filling.  5. Moderate tricuspid regurgitation.  6. Mild mitral regurgitation.    12/02/2021 - TTE  1. The left ventricular systolic function is moderately decreased with a 40-45% estimated ejection fraction.  2. Spectral Doppler shows an impaired relaxation pattern of left ventricular diastolic filling.  3. There is moderately reduced right ventricular systolic function.  4. Moderate mitral valve regurgitation.  5. There is moderate to severe tricuspid regurgitation.     06/10/2020 - U/S Abdomen  Ectatic abdominal aorta measuring up to 2.8 cm in caliber, otherwise unremarkable exam.     05/27/2020 - TTE  1. The left ventricular systolic function is moderately decreased with a 40% estimated ejection fraction.  2. Spectral Doppler shows a pseudonormal pattern of left ventricular diastolic filling.  3. RVSP within normal limits.  4. There is moderate tricuspid regurgitation.  5. Aortic valve stenosis is not present.  6. There is global hypokinesis of the left ventricle with minor regional variations.     06/07/2019 - Cardiac Catheterization (LH)  1. Mild non-obstructive  coronary artery disease.  2. Left Ventricular end-diastolic pressure = 8.  3. Normal LV filling pressures.      05/20/2019 - NM Cardiac Stress Test  1. Medium-sized area of fixed perfusion defect of the lateral segment, consistent with myocardial scar or hibernating myocardium in left circumflex territory. No ischemia was identified.   2. Mildly reduced left ventricular systolic function on post stress gated imaging.      05/20/2019 - Regadenoson Stress Test   Nondiagnostic secondary to a ventricular paced rhythm.      05/20/2019 - Echocardiogram   1. Moderate left ventricular systolic dysfunction with regional abnormalities with an ejection fraction of 35%.  2. Akinetic inferolateral segments.  3. Hypokinetic basal/mid anterolateral segment.   4. Normal right ventricular size and systolic function.   5. Mild mitral regurgitation.  6. Moderate tricuspid regurgitation.     02/21/2018 - TTE  1. The left ventricular systolic function is mildly decreased with a 45% estimated ejection fraction.  2. Abnormal septal motion consistent with RV pacemaker.  3. Spectral Doppler shows an impaired relaxation pattern of left ventricular diastolic filling.     Lab review: I have personally reviewed the laboratory result(s).  Diagnostic review: I have personally reviewed the result(s) of the Echocardiogram.    Assessment/Plan   1) Ischemic Cardiomyopathy, Chronic Systolic Heart Failure, LBBB  S/P Bi-V ICD in 2016  On Coreg 50 mg BID and lisinopril 40 mg daily  Compensated NYHA Class 2  TTE 02/14/2024 with LVEF 45-50%, aneurysmal mid inferolateral wall  Stable  F/U 6 months    2) CAD s/p STEMI s/p PCI OM1 2016, Mitral and Tricuspid Regurgitation   On aspirin 81 mg daily, carvedilol 50 mg BID, lisinopril 40 mg daily   TTE 02/14/2024 with LVEF 45-50%, aneurysmal mid inferolateral wall, moderate tricuspid regurgitation, mild mitral regurgitation.  Denies CP, chest discomfort or SOB  EKG stable  F/U 6 months    3) Dyslipidemia  Goal LDL  <70  On rosuvastatin 40 mg daily  Lipid panel 05/08/2023 with elevated total cholesterol and LDL of 212 and 137 respectively - per patient, she was not taking rosuvastatin consistently.  Patient has been taking rosuvastatin consistently   Check lipid panel   F/U 6 months     4) Smoking  Patient continues to smoke - reduced quantity   Continues to decline any assistance      5) Exophytic Growth on GB  Per patient, she was seen by Dr. Solano and no further intervention required regarding gallbladder  Dr. Solano did recommend EGD/ screening colonoscopy, but she declined to have this done  Educated that screening colonoscopy is recommended    6) Prediabetes  Per PCP         Scribe Attestation  By signing my name below, I, Priscilla Blackmon, Scribe   attest that this documentation has been prepared under the direction and in the presence of Alex Franklin MD.

## 2024-03-19 ENCOUNTER — OFFICE VISIT (OUTPATIENT)
Dept: CARDIOLOGY | Facility: CLINIC | Age: 64
End: 2024-03-19
Payer: MEDICARE

## 2024-03-19 VITALS
WEIGHT: 180 LBS | SYSTOLIC BLOOD PRESSURE: 94 MMHG | HEIGHT: 66 IN | DIASTOLIC BLOOD PRESSURE: 60 MMHG | BODY MASS INDEX: 28.93 KG/M2 | HEART RATE: 64 BPM

## 2024-03-19 DIAGNOSIS — I25.10 CORONARY ARTERY DISEASE INVOLVING NATIVE CORONARY ARTERY OF NATIVE HEART, UNSPECIFIED WHETHER ANGINA PRESENT: Primary | ICD-10-CM

## 2024-03-19 PROCEDURE — 1160F RVW MEDS BY RX/DR IN RCRD: CPT | Performed by: INTERNAL MEDICINE

## 2024-03-19 PROCEDURE — 99213 OFFICE O/P EST LOW 20 MIN: CPT | Performed by: INTERNAL MEDICINE

## 2024-03-19 PROCEDURE — 4010F ACE/ARB THERAPY RXD/TAKEN: CPT | Performed by: INTERNAL MEDICINE

## 2024-03-19 PROCEDURE — 3078F DIAST BP <80 MM HG: CPT | Performed by: INTERNAL MEDICINE

## 2024-03-19 PROCEDURE — 1159F MED LIST DOCD IN RCRD: CPT | Performed by: INTERNAL MEDICINE

## 2024-03-19 PROCEDURE — 93000 ELECTROCARDIOGRAM COMPLETE: CPT | Performed by: INTERNAL MEDICINE

## 2024-03-19 PROCEDURE — 3074F SYST BP LT 130 MM HG: CPT | Performed by: INTERNAL MEDICINE

## 2024-03-19 RX ORDER — BUDESONIDE AND FORMOTEROL FUMARATE DIHYDRATE 160; 4.5 UG/1; UG/1
AEROSOL RESPIRATORY (INHALATION) EVERY 12 HOURS
COMMUNITY
Start: 2017-08-10 | End: 2024-04-25 | Stop reason: ALTCHOICE

## 2024-03-19 ASSESSMENT — ENCOUNTER SYMPTOMS
LOSS OF SENSATION IN FEET: 0
OCCASIONAL FEELINGS OF UNSTEADINESS: 0
DEPRESSION: 0

## 2024-03-19 NOTE — PATIENT INSTRUCTIONS
Continue all current medications as prescribed.  Please have blood work drawn to followup on your cholesterol. You will be notified of the results once they become available.   Followup with Leigha Castaneda NP, in 6 months.      If you have any questions or cardiac concerns, please call our office at [phone].

## 2024-03-19 NOTE — LETTER
March 19, 2024     Katerine Dewey, [facility]  [address] [facility] [address]  [facility] [place] [address]    Patient: Sophie Cunningham   YOB: 1960   Date of Visit: 3/19/2024       Dear Dr. Katerine Dewey, SUNIJOR[facility]:    Thank you for referring Sophie Cunningham to me for evaluation. Below are my notes for this consultation.  If you have questions, please do not hesitate to call me. I look forward to following your patient along with you.       Sincerely,     Alex Franklin MD      CC: No Recipients  ______________________________________________________________________________________    Counseling:  The patient was counseled regarding diagnostic results, instructions for management, risk factor reductions, prognosis, patient and family education, impressions, risks and benefits of treatment options and importance of compliance with treatment.      Chief Complaint:   The patient presents today for 6-month followup of CMP/heart failure, CAD, mitral and tricuspid regurgitation, and dyslipidemia s/p echocardiogram.      History Of Present Illness:    Sophie Cunningham is a 64 year old female patient who presents today for 6-month followup of CMP/heart failure, CAD, mitral and tricuspid regurgitation, and dyslipidemia s/p echocardiogram. Her PMH is significant for CAD s/p PCI of OM1 in 2016 (appears to have presented as STEMI per chart review), nonischemic cardiomyopathy, LBBB, chronic systolic heart failure s/p Bi-V ICD in 2016, hyperlipidemia, prediabetes, thrombocytopenia and nicotine dependence. Echocardiogram performed 02/14/2024 demonstrated an EF of 45-50%, aneurysmal mid inferolateral wall, moderate tricuspid regurgitation and mild mitral regurgitation. Over the past 6 months, the patient states that she has done well from a cardiac standpoint. She denies any CP, chest discomfort or SOB. Unfortunately, the patient continues to smoke, although at reduced quantity. EKG today is stable with no acute  "changes. The patient is compliant with her prescribed medications.     Last Recorded Vitals:  Vitals:    03/19/24 1109 03/19/24 1123   BP: 96/60 94/60   BP Location: Left arm    Pulse: 64    Weight: 81.6 kg (180 lb)    Height: 1.676 m (5' 6\")        Past Medical History:  She has a past medical history of Acute upper respiratory infection, unspecified (03/29/2019), Cardiac tamponade, Chronic obstructive pulmonary disease with (acute) exacerbation (CMS/LTAC, located within [facility]) (05/14/2018), Chronic sinusitis, unspecified (01/30/2017), Disorder of the skin and subcutaneous tissue, unspecified (09/06/2017), Personal history of other diseases of the digestive system (07/14/2017), Personal history of other diseases of the respiratory system (03/29/2019), Personal history of other medical treatment (01/13/2017), Personal history of other medical treatment, Personal history of other specified conditions (10/21/2016), Personal history of other specified conditions (03/29/2019), and Personal history of other specified conditions (03/29/2019).    Past Surgical History:  She has a past surgical history that includes Cervical biopsy w/ loop electrode excision (06/01/2016); Other surgical history (06/01/2016); Other surgical history (10/18/2016); and Cardiac pacemaker placement (10/12/2016).      Social History:  She reports that she has been smoking cigarettes. She has been smoking an average of .25 packs per day. She has never used smokeless tobacco. She reports that she does not currently use alcohol. She reports current drug use. Drug: Marijuana.    Family History:  Family History   Problem Relation Name Age of Onset   • Pneumonia Mother     • Alcohol abuse Father     • Liver disease Father     • Thyroid disease Sister     • Throat cancer Brother     • Coronary artery disease Mother's Brother     • Coronary artery disease Maternal Grandfather     • Alcohol abuse Paternal Grandfather     • Heart attack Other Grandfather       "   Allergies:  Codeine    Outpatient Medications:  Current Outpatient Medications   Medication Instructions   • albuterol 90 mcg/actuation inhaler 1 puff, inhalation, Every 4 hours PRN   • albuterol 2.5 mg, nebulization, Every 6 hours PRN   • aspirin 81 mg EC tablet 1 tablet, oral, Daily   • blood sugar diagnostic strip 1 strip, miscellaneous, Daily, Give what is covered by insurance   • blood-glucose meter misc 1 each, miscellaneous, Daily, Give what is covered by insurance   • budesonide-formoteroL (Symbicort) 160-4.5 mcg/actuation inhaler inhalation, Every 12 hours   • carvedilol (COREG) 50 mg, oral, 2 times daily, COREG 25 MG TABEQUIV   • fenofibrate (TRIGLIDE) 160 mg, oral, Daily   • furosemide (LASIX) 20 mg, oral, Daily PRN   • lancets misc 1 Lancet, subcutaneous, Daily, Give what is covered by insurance   • lisinopril 40 mg tablet 1 tablet, oral, Daily   • multivitamin tablet 1 tablet, oral, Daily   • nitroglycerin (NITROSTAT) 0.4 mg, sublingual, Every 5 min PRN   • pantoprazole (PROTONIX) 40 mg, oral, Daily before breakfast, Protonix 40 mg tab equiv   • rosuvastatin (CRESTOR) 40 mg, oral, Nightly     Review of Systems   All other systems reviewed and are negative.     Physical Exam:  Constitutional:       Appearance: Healthy appearance. Not in distress.   Neck:      Vascular: No JVR. JVD normal.   Pulmonary:      Effort: Pulmonary effort is normal.      Breath sounds: Normal breath sounds. No wheezing. No rhonchi. No rales.   Chest:      Chest wall: Not tender to palpatation.   Cardiovascular:      PMI at left midclavicular line. Normal rate. Regular rhythm. Normal S1. Normal S2.       Murmurs: There is no murmur.      No gallop.  No click. No rub.   Pulses:     Intact distal pulses.   Edema:     Peripheral edema absent.   Abdominal:      General: Bowel sounds are normal.      Palpations: Abdomen is soft.      Tenderness: There is no abdominal tenderness.   Musculoskeletal: Normal range of motion.          General: No tenderness. Skin:     General: Skin is warm and dry.   Neurological:      General: No focal deficit present.      Mental Status: Alert and oriented to person, place and time.          Last Labs:  CBC -  Lab Results   Component Value Date    WBC 9.3 05/08/2023    HGB 12.8 05/08/2023    HCT 40.2 05/08/2023    MCV 93 05/08/2023     05/08/2023       CMP -  Lab Results   Component Value Date    CALCIUM 9.2 05/08/2023    PROT 6.0 (L) 05/08/2023    ALBUMIN 3.8 05/08/2023    AST 18 05/08/2023    ALT 17 05/08/2023    ALKPHOS 43 05/08/2023    BILITOT 0.7 05/08/2023       LIPID PANEL -   Lab Results   Component Value Date    CHOL 212 (H) 05/08/2023    TRIG 189 (H) 05/08/2023    HDL 37.5 (A) 05/08/2023    CHHDL 5.7 (A) 05/08/2023    LDLF 137 (H) 05/08/2023    VLDL 38 05/08/2023    NHDL 102 11/05/2018       RENAL FUNCTION PANEL -   Lab Results   Component Value Date    GLUCOSE 95 05/08/2023     05/08/2023    K 4.3 05/08/2023     (H) 05/08/2023    CO2 28 05/08/2023    ANIONGAP 9 (L) 05/08/2023    BUN 12 05/08/2023    CREATININE 0.80 05/08/2023    CALCIUM 9.2 05/08/2023    ALBUMIN 3.8 05/08/2023        Lab Results   Component Value Date    HGBA1C 6.6 (A) 05/08/2023       Last Cardiology Tests:  02/14/2024 - TTE  1. Left ventricular systolic function is mildly decreased with a 45-50% estimated ejection fraction.  2. Mid inferolateral segment is abnormal.  3. Aneurysmal mid inferolateral wall.  4. Spectral Doppler shows an impaired relaxation pattern of left ventricular diastolic filling.  5. Moderate tricuspid regurgitation.  6. Mild mitral regurgitation.    12/02/2021 - TTE  1. The left ventricular systolic function is moderately decreased with a 40-45% estimated ejection fraction.  2. Spectral Doppler shows an impaired relaxation pattern of left ventricular diastolic filling.  3. There is moderately reduced right ventricular systolic function.  4. Moderate mitral valve regurgitation.  5. There is  moderate to severe tricuspid regurgitation.     06/10/2020 - U/S Abdomen  Ectatic abdominal aorta measuring up to 2.8 cm in caliber, otherwise unremarkable exam.     05/27/2020 - TTE  1. The left ventricular systolic function is moderately decreased with a 40% estimated ejection fraction.  2. Spectral Doppler shows a pseudonormal pattern of left ventricular diastolic filling.  3. RVSP within normal limits.  4. There is moderate tricuspid regurgitation.  5. Aortic valve stenosis is not present.  6. There is global hypokinesis of the left ventricle with minor regional variations.     06/07/2019 - Cardiac Catheterization (LH)  1. Mild non-obstructive coronary artery disease.  2. Left Ventricular end-diastolic pressure = 8.  3. Normal LV filling pressures.      05/20/2019 - NM Cardiac Stress Test  1. Medium-sized area of fixed perfusion defect of the lateral segment, consistent with myocardial scar or hibernating myocardium in left circumflex territory. No ischemia was identified.   2. Mildly reduced left ventricular systolic function on post stress gated imaging.      05/20/2019 - Regadenoson Stress Test   Nondiagnostic secondary to a ventricular paced rhythm.      05/20/2019 - Echocardiogram   1. Moderate left ventricular systolic dysfunction with regional abnormalities with an ejection fraction of 35%.  2. Akinetic inferolateral segments.  3. Hypokinetic basal/mid anterolateral segment.   4. Normal right ventricular size and systolic function.   5. Mild mitral regurgitation.  6. Moderate tricuspid regurgitation.     02/21/2018 - TTE  1. The left ventricular systolic function is mildly decreased with a 45% estimated ejection fraction.  2. Abnormal septal motion consistent with RV pacemaker.  3. Spectral Doppler shows an impaired relaxation pattern of left ventricular diastolic filling.     Lab review: I have personally reviewed the laboratory result(s).  Diagnostic review: I have personally reviewed the result(s) of  the Echocardiogram.    Assessment/Plan  1) Ischemic Cardiomyopathy, Chronic Systolic Heart Failure, LBBB  S/P Bi-V ICD in 2016  On Coreg 50 mg BID and lisinopril 40 mg daily  Compensated NYHA Class 2  TTE 02/14/2024 with LVEF 45-50%, aneurysmal mid inferolateral wall  Stable  F/U 6 months    2) CAD s/p STEMI s/p PCI OM1 2016, Mitral and Tricuspid Regurgitation   On aspirin 81 mg daily, carvedilol 50 mg BID, lisinopril 40 mg daily   TTE 02/14/2024 with LVEF 45-50%, aneurysmal mid inferolateral wall, moderate tricuspid regurgitation, mild mitral regurgitation.  Denies CP, chest discomfort or SOB  EKG stable  F/U 6 months    3) Dyslipidemia  Goal LDL <70  On rosuvastatin 40 mg daily  Lipid panel 05/08/2023 with elevated total cholesterol and LDL of 212 and 137 respectively - per patient, she was not taking rosuvastatin consistently.  Patient has been taking rosuvastatin consistently   Check lipid panel   F/U 6 months     4) Smoking  Patient continues to smoke - reduced quantity   Continues to decline any assistance      5) Exophytic Growth on GB  Per patient, she was seen by Dr. Solano and no further intervention required regarding gallbladder  Dr. Solano did recommend EGD/ screening colonoscopy, but she declined to have this done  Educated that screening colonoscopy is recommended    6) Prediabetes  Per PCP         Scribe Attestation  By signing my name below, I, Priscilla Blackmon, Scribe   attest that this documentation has been prepared under the direction and in the presence of Alex Franklin MD.

## 2024-04-11 ENCOUNTER — HOSPITAL ENCOUNTER (OUTPATIENT)
Dept: CARDIOLOGY | Facility: HOSPITAL | Age: 64
Discharge: HOME | End: 2024-04-11
Payer: MEDICARE

## 2024-04-11 DIAGNOSIS — I42.0 DILATED CARDIOMYOPATHY (MULTI): ICD-10-CM

## 2024-04-11 DIAGNOSIS — Z95.810 PRESENCE OF AUTOMATIC (IMPLANTABLE) CARDIAC DEFIBRILLATOR: ICD-10-CM

## 2024-04-11 PROCEDURE — 93296 REM INTERROG EVL PM/IDS: CPT

## 2024-04-25 ENCOUNTER — OFFICE VISIT (OUTPATIENT)
Dept: PRIMARY CARE | Facility: CLINIC | Age: 64
End: 2024-04-25
Payer: MEDICARE

## 2024-04-25 VITALS
HEART RATE: 74 BPM | WEIGHT: 175 LBS | BODY MASS INDEX: 28.12 KG/M2 | SYSTOLIC BLOOD PRESSURE: 90 MMHG | HEIGHT: 66 IN | DIASTOLIC BLOOD PRESSURE: 50 MMHG

## 2024-04-25 DIAGNOSIS — E78.5 DYSLIPIDEMIA: ICD-10-CM

## 2024-04-25 DIAGNOSIS — R73.03 PREDIABETES: ICD-10-CM

## 2024-04-25 DIAGNOSIS — J44.9 CHRONIC OBSTRUCTIVE PULMONARY DISEASE, UNSPECIFIED COPD TYPE (MULTI): ICD-10-CM

## 2024-04-25 DIAGNOSIS — E11.9 DIABETES MELLITUS WITHOUT COMPLICATION (MULTI): ICD-10-CM

## 2024-04-25 DIAGNOSIS — I42.0 CARDIOMYOPATHY, DILATED, NONISCHEMIC (MULTI): ICD-10-CM

## 2024-04-25 DIAGNOSIS — Z00.00 MEDICARE ANNUAL WELLNESS VISIT, SUBSEQUENT: ICD-10-CM

## 2024-04-25 DIAGNOSIS — Z78.0 POST-MENOPAUSAL: ICD-10-CM

## 2024-04-25 DIAGNOSIS — I25.10 CORONARY ARTERY DISEASE, UNSPECIFIED VESSEL OR LESION TYPE, UNSPECIFIED WHETHER ANGINA PRESENT, UNSPECIFIED WHETHER NATIVE OR TRANSPLANTED HEART: ICD-10-CM

## 2024-04-25 DIAGNOSIS — I50.22 CHRONIC SYSTOLIC HEART FAILURE (MULTI): ICD-10-CM

## 2024-04-25 DIAGNOSIS — Z12.11 COLON CANCER SCREENING: ICD-10-CM

## 2024-04-25 DIAGNOSIS — L98.9 CHANGING SKIN LESION: ICD-10-CM

## 2024-04-25 DIAGNOSIS — D69.6 THROMBOCYTOPENIA (CMS-HCC): ICD-10-CM

## 2024-04-25 DIAGNOSIS — Z12.31 VISIT FOR SCREENING MAMMOGRAM: ICD-10-CM

## 2024-04-25 DIAGNOSIS — H61.21 IMPACTED CERUMEN OF RIGHT EAR: Primary | ICD-10-CM

## 2024-04-25 PROCEDURE — 1170F FXNL STATUS ASSESSED: CPT | Performed by: CLINICAL NURSE SPECIALIST

## 2024-04-25 PROCEDURE — 3074F SYST BP LT 130 MM HG: CPT | Performed by: CLINICAL NURSE SPECIALIST

## 2024-04-25 PROCEDURE — 4010F ACE/ARB THERAPY RXD/TAKEN: CPT | Performed by: CLINICAL NURSE SPECIALIST

## 2024-04-25 PROCEDURE — 4004F PT TOBACCO SCREEN RCVD TLK: CPT | Performed by: CLINICAL NURSE SPECIALIST

## 2024-04-25 PROCEDURE — 3078F DIAST BP <80 MM HG: CPT | Performed by: CLINICAL NURSE SPECIALIST

## 2024-04-25 PROCEDURE — G0439 PPPS, SUBSEQ VISIT: HCPCS | Performed by: CLINICAL NURSE SPECIALIST

## 2024-04-25 PROCEDURE — 1160F RVW MEDS BY RX/DR IN RCRD: CPT | Performed by: CLINICAL NURSE SPECIALIST

## 2024-04-25 PROCEDURE — 1124F ACP DISCUSS-NO DSCNMKR DOCD: CPT | Performed by: CLINICAL NURSE SPECIALIST

## 2024-04-25 PROCEDURE — 99214 OFFICE O/P EST MOD 30 MIN: CPT | Performed by: CLINICAL NURSE SPECIALIST

## 2024-04-25 PROCEDURE — 1159F MED LIST DOCD IN RCRD: CPT | Performed by: CLINICAL NURSE SPECIALIST

## 2024-04-25 PROCEDURE — G0444 DEPRESSION SCREEN ANNUAL: HCPCS | Performed by: CLINICAL NURSE SPECIALIST

## 2024-04-25 RX ORDER — ROSUVASTATIN CALCIUM 40 MG/1
40 TABLET, COATED ORAL NIGHTLY
Qty: 90 TABLET | Refills: 3 | Status: SHIPPED | OUTPATIENT
Start: 2024-04-25 | End: 2025-04-20

## 2024-04-25 ASSESSMENT — ENCOUNTER SYMPTOMS
OCCASIONAL FEELINGS OF UNSTEADINESS: 0
NECK PAIN: 0
CHILLS: 0
EYE PAIN: 0
WOUND: 0
PALPITATIONS: 0
TROUBLE SWALLOWING: 0
VOMITING: 0
PHOTOPHOBIA: 0
BRUISES/BLEEDS EASILY: 0
DYSURIA: 0
NAUSEA: 0
BACK PAIN: 0
DIARRHEA: 0
SLEEP DISTURBANCE: 0
FEVER: 0
HEMATURIA: 0
CONSTIPATION: 0
ACTIVITY CHANGE: 0
SHORTNESS OF BREATH: 0
LOSS OF SENSATION IN FEET: 0
SORE THROAT: 0
CHEST TIGHTNESS: 0
CONFUSION: 0
UNEXPECTED WEIGHT CHANGE: 0
JOINT SWELLING: 0
HEADACHES: 0
APPETITE CHANGE: 0
SEIZURES: 0
MYALGIAS: 0
POLYDIPSIA: 0
WHEEZING: 0
DIZZINESS: 0
ARTHRALGIAS: 0
FLANK PAIN: 0
ABDOMINAL PAIN: 0
FATIGUE: 0
DEPRESSION: 0
BLOOD IN STOOL: 0
COUGH: 0

## 2024-04-25 ASSESSMENT — COLUMBIA-SUICIDE SEVERITY RATING SCALE - C-SSRS
2. HAVE YOU ACTUALLY HAD ANY THOUGHTS OF KILLING YOURSELF?: NO
6. HAVE YOU EVER DONE ANYTHING, STARTED TO DO ANYTHING, OR PREPARED TO DO ANYTHING TO END YOUR LIFE?: NO
1. IN THE PAST MONTH, HAVE YOU WISHED YOU WERE DEAD OR WISHED YOU COULD GO TO SLEEP AND NOT WAKE UP?: NO

## 2024-04-25 ASSESSMENT — PATIENT HEALTH QUESTIONNAIRE - PHQ9
2. FEELING DOWN, DEPRESSED OR HOPELESS: NOT AT ALL
1. LITTLE INTEREST OR PLEASURE IN DOING THINGS: NOT AT ALL
SUM OF ALL RESPONSES TO PHQ9 QUESTIONS 1 AND 2: 0

## 2024-04-25 ASSESSMENT — ACTIVITIES OF DAILY LIVING (ADL)
MANAGING_FINANCES: INDEPENDENT
DOING_HOUSEWORK: INDEPENDENT
BATHING: INDEPENDENT
DRESSING: INDEPENDENT
TAKING_MEDICATION: INDEPENDENT
GROCERY_SHOPPING: INDEPENDENT

## 2024-04-25 NOTE — PROGRESS NOTES
Subjective   Reason for Visit: Sophie Cunningham is an 64 y.o. female here for a Medicare Wellness visit.          Reviewed all medications by prescribing practitioner or clinical pharmacist (such as prescriptions, OTCs, herbal therapies and supplements) and documented in the medical record.    HPI    Here today as a follow up appointment. Due for Medicare Wellness.      Following with Dr. Franklin for Cardiology. [facility]. MI in 2016 with Stent placement. Dyslipidemia, has been taking Simvastatin and Fenofibrate.      COPD. Albuterol PRN with Nebulizer solution. Respiratory status at baseline.      Has been diagnosed with Diabetes. Previously was Metformin, not comfortable with taking medication. Has been monitoring blood sugars.  States that her blood sugars are well controlled at home. Last checked through Insurance, A1C 5.9% per patient. Due for updated lab work.     Right ear has been uncomfortable. Ringing intermittent.     Patient Care Team:  JAMEL Soto as PCP - General (Internal Medicine)  JAMEL Soto as PCP - [employer] PCP  Alex Franklin MD as Consulting Physician (Cardiology)     Review of Systems   Constitutional:  Negative for activity change, appetite change, chills, fatigue, fever and unexpected weight change.   HENT:  Positive for ear pain. Negative for hearing loss, nosebleeds, sore throat, tinnitus and trouble swallowing.    Eyes:  Negative for photophobia, pain and visual disturbance.   Respiratory:  Negative for cough, chest tightness, shortness of breath and wheezing.    Cardiovascular:  Negative for chest pain, palpitations and leg swelling.   Gastrointestinal:  Negative for abdominal pain, blood in stool, constipation, diarrhea, nausea and vomiting.   Endocrine: Negative for cold intolerance, heat intolerance, polydipsia and polyuria.   Genitourinary:  Negative for dysuria, flank pain and hematuria.   Musculoskeletal:  Negative for  "arthralgias, back pain, joint swelling, myalgias and neck pain.   Skin:  Negative for pallor, rash and wound.   Allergic/Immunologic: Negative for immunocompromised state.   Neurological:  Negative for dizziness, seizures and headaches.   Hematological:  Does not bruise/bleed easily.   Psychiatric/Behavioral:  Negative for confusion and sleep disturbance.        Objective   Vitals:  BP 90/50 (BP Location: Left arm, Patient Position: Sitting)   Pulse 74   Ht 1.666 m (5' 5.58\")   Wt 79.4 kg (175 lb)   BMI 28.61 kg/m²       Physical Exam  Vitals and nursing note reviewed.   Constitutional:       General: She is not in acute distress.     Appearance: Normal appearance.   HENT:      Head: Normocephalic.      Right Ear: There is impacted cerumen.      Nose: Nose normal.   Eyes:      Conjunctiva/sclera: Conjunctivae normal.   Neck:      Vascular: No carotid bruit.   Cardiovascular:      Rate and Rhythm: Normal rate and regular rhythm.      Pulses: Normal pulses.      Heart sounds: Normal heart sounds.   Pulmonary:      Effort: Pulmonary effort is normal.      Breath sounds: Normal breath sounds.   Abdominal:      General: Bowel sounds are normal.      Palpations: Abdomen is soft.   Musculoskeletal:         General: Normal range of motion.      Cervical back: Normal range of motion.   Skin:     General: Skin is warm and dry.   Neurological:      Mental Status: She is alert and oriented to person, place, and time. Mental status is at baseline.   Psychiatric:         Mood and Affect: Mood normal.         Behavior: Behavior normal.         Assessment/Plan   Problem List Items Addressed This Visit       CAD (coronary artery disease)    Cardiomyopathy, dilated, nonischemic (Multi)    Chronic systolic heart failure (Multi)    Dyslipidemia    Relevant Medications    rosuvastatin (Crestor) 40 mg tablet    Thrombocytopenia (CMS-HCC)    Prediabetes    Medicare annual wellness visit, subsequent    Diabetes mellitus without " complication (Multi)     Other Visit Diagnoses       Colon cancer screening        Visit for screening mammogram        Changing skin lesion        Post-menopausal                Due for updated lab work. New order provided at [facility] today.        Cardiomyopathy, CAD, Heart Failure, LBBB: Following with Cardiology for management. Stable at this time. Continue to monitor.   Changing Skin Lesion: Dermatology referral. Planning to have removal done in May.   Dyslipidemia: Continue medication as prescribed. Updated lab work ordered.   Thrombocytopenia: Updated lab work ordered.   Diabetes: A1C 6.6%. Discussed continued lifestyle modifications. Encourage updated eye exams and Podiatry. Updated lab work ordered.   COPD: Respiratory status at baseline. Continue Albuterol.   Medicare Wellness: Routine and age appropriate recommendations discussed with the patient today and patient verbalized understanding of the recommendations.  Questions answered.  Age appropriate immunizations and preventative screenings discussed with the patient and ordered as appropriate. Labs updated and ordered as indicated. Recommend healthy diet and daily exercise to maintain healthy body weight.   Cerumen Impaction: Irrigation performed at [facility] today.      Medicare Wellness: April 2024.   Declined Colon Cancer Screening.   Mammogram ordered.   Bone Density ordered.   Prevnar: April 2023.   Declined Flu Vaccine.

## 2024-04-26 ENCOUNTER — LAB (OUTPATIENT)
Dept: LAB | Facility: LAB | Age: 64
End: 2024-04-26
Payer: MEDICARE

## 2024-04-26 DIAGNOSIS — R73.03 PREDIABETES: ICD-10-CM

## 2024-04-26 DIAGNOSIS — E53.8 VITAMIN B12 DEFICIENCY: ICD-10-CM

## 2024-04-26 DIAGNOSIS — I25.10 CORONARY ARTERY DISEASE, UNSPECIFIED VESSEL OR LESION TYPE, UNSPECIFIED WHETHER ANGINA PRESENT, UNSPECIFIED WHETHER NATIVE OR TRANSPLANTED HEART: ICD-10-CM

## 2024-04-26 DIAGNOSIS — Z78.0 POST-MENOPAUSAL: ICD-10-CM

## 2024-04-26 DIAGNOSIS — Z00.00 MEDICARE ANNUAL WELLNESS VISIT, SUBSEQUENT: ICD-10-CM

## 2024-04-26 DIAGNOSIS — Z12.11 COLON CANCER SCREENING: ICD-10-CM

## 2024-04-26 DIAGNOSIS — I50.22 CHRONIC SYSTOLIC HEART FAILURE (MULTI): ICD-10-CM

## 2024-04-26 DIAGNOSIS — E11.9 DIABETES MELLITUS WITHOUT COMPLICATION (MULTI): ICD-10-CM

## 2024-04-26 DIAGNOSIS — L98.9 CHANGING SKIN LESION: ICD-10-CM

## 2024-04-26 DIAGNOSIS — E78.5 DYSLIPIDEMIA: ICD-10-CM

## 2024-04-26 DIAGNOSIS — I42.0 CARDIOMYOPATHY, DILATED, NONISCHEMIC (MULTI): ICD-10-CM

## 2024-04-26 DIAGNOSIS — I25.10 CORONARY ARTERY DISEASE INVOLVING NATIVE CORONARY ARTERY OF NATIVE HEART, UNSPECIFIED WHETHER ANGINA PRESENT: ICD-10-CM

## 2024-04-26 DIAGNOSIS — D69.6 THROMBOCYTOPENIA (CMS-HCC): ICD-10-CM

## 2024-04-26 DIAGNOSIS — E11.9 TYPE 2 DIABETES MELLITUS WITHOUT COMPLICATION, WITHOUT LONG-TERM CURRENT USE OF INSULIN (MULTI): ICD-10-CM

## 2024-04-26 DIAGNOSIS — Z12.31 VISIT FOR SCREENING MAMMOGRAM: ICD-10-CM

## 2024-04-26 LAB
ALBUMIN SERPL BCP-MCNC: 3.8 G/DL (ref 3.4–5)
ALP SERPL-CCNC: 49 U/L (ref 33–136)
ALT SERPL W P-5'-P-CCNC: 16 U/L (ref 7–45)
ANION GAP SERPL CALC-SCNC: 12 MMOL/L (ref 10–20)
AST SERPL W P-5'-P-CCNC: 20 U/L (ref 9–39)
BILIRUB SERPL-MCNC: 0.9 MG/DL (ref 0–1.2)
BUN SERPL-MCNC: 8 MG/DL (ref 6–23)
CALCIUM SERPL-MCNC: 8.9 MG/DL (ref 8.6–10.3)
CHLORIDE SERPL-SCNC: 110 MMOL/L (ref 98–107)
CHOLEST SERPL-MCNC: 130 MG/DL (ref 0–199)
CHOLESTEROL/HDL RATIO: 3.8
CO2 SERPL-SCNC: 26 MMOL/L (ref 21–32)
CREAT SERPL-MCNC: 0.81 MG/DL (ref 0.5–1.05)
CREAT UR-MCNC: 171 MG/DL (ref 20–320)
EGFRCR SERPLBLD CKD-EPI 2021: 80 ML/MIN/1.73M*2
ERYTHROCYTE [DISTWIDTH] IN BLOOD BY AUTOMATED COUNT: 13.6 % (ref 11.5–14.5)
EST. AVERAGE GLUCOSE BLD GHB EST-MCNC: 146 MG/DL
GLUCOSE SERPL-MCNC: 104 MG/DL (ref 74–99)
HBA1C MFR BLD: 6.7 %
HCT VFR BLD AUTO: 42.8 % (ref 36–46)
HDLC SERPL-MCNC: 33.8 MG/DL
HGB BLD-MCNC: 13.5 G/DL (ref 12–16)
LDLC SERPL CALC-MCNC: 58 MG/DL
MCH RBC QN AUTO: 29.3 PG (ref 26–34)
MCHC RBC AUTO-ENTMCNC: 31.5 G/DL (ref 32–36)
MCV RBC AUTO: 93 FL (ref 80–100)
MICROALBUMIN UR-MCNC: 86.8 MG/L
MICROALBUMIN/CREAT UR: 50.8 UG/MG CREAT
NON HDL CHOLESTEROL: 96 MG/DL (ref 0–149)
NRBC BLD-RTO: 0 /100 WBCS (ref 0–0)
PLATELET # BLD AUTO: 157 X10*3/UL (ref 150–450)
POTASSIUM SERPL-SCNC: 4.2 MMOL/L (ref 3.5–5.3)
PROT SERPL-MCNC: 6.1 G/DL (ref 6.4–8.2)
RBC # BLD AUTO: 4.61 X10*6/UL (ref 4–5.2)
SODIUM SERPL-SCNC: 144 MMOL/L (ref 136–145)
TRIGL SERPL-MCNC: 193 MG/DL (ref 0–149)
TSH SERPL-ACNC: 1.01 MIU/L (ref 0.44–3.98)
VIT B12 SERPL-MCNC: 1472 PG/ML (ref 211–911)
VLDL: 39 MG/DL (ref 0–40)
WBC # BLD AUTO: 14.3 X10*3/UL (ref 4.4–11.3)

## 2024-04-26 PROCEDURE — 36415 COLL VENOUS BLD VENIPUNCTURE: CPT

## 2024-04-26 PROCEDURE — 84443 ASSAY THYROID STIM HORMONE: CPT

## 2024-04-26 PROCEDURE — 80061 LIPID PANEL: CPT

## 2024-04-26 PROCEDURE — 82607 VITAMIN B-12: CPT

## 2024-04-26 PROCEDURE — 80053 COMPREHEN METABOLIC PANEL: CPT

## 2024-04-26 PROCEDURE — 85027 COMPLETE CBC AUTOMATED: CPT

## 2024-04-26 PROCEDURE — 82570 ASSAY OF URINE CREATININE: CPT

## 2024-04-26 PROCEDURE — 83036 HEMOGLOBIN GLYCOSYLATED A1C: CPT

## 2024-04-26 PROCEDURE — 82043 UR ALBUMIN QUANTITATIVE: CPT

## 2024-04-29 ENCOUNTER — TELEPHONE (OUTPATIENT)
Dept: PRIMARY CARE | Facility: CLINIC | Age: 64
End: 2024-04-29
Payer: MEDICARE

## 2024-04-29 DIAGNOSIS — E78.5 DYSLIPIDEMIA: ICD-10-CM

## 2024-04-29 DIAGNOSIS — E11.9 TYPE 2 DIABETES MELLITUS WITHOUT COMPLICATION, WITHOUT LONG-TERM CURRENT USE OF INSULIN (MULTI): ICD-10-CM

## 2024-04-29 DIAGNOSIS — E53.8 VITAMIN B12 DEFICIENCY: ICD-10-CM

## 2024-04-29 NOTE — TELEPHONE ENCOUNTER
----- Message from SUNI Soto-CNS sent at 4/27/2024 11:45 PM EDT -----  Please let patient know that A1C is 6.7%, still controlled but Albumin is elevated? Agreeable to medication for her levels? Can decrease the Vitamin B12. Cholesterol has improved. Plan to repeat lab work prior to follow up appointment. Please order lab work. Thank you!

## 2024-05-26 ENCOUNTER — APPOINTMENT (OUTPATIENT)
Dept: CARDIOLOGY | Facility: HOSPITAL | Age: 64
End: 2024-05-26
Payer: MEDICARE

## 2024-05-26 ENCOUNTER — APPOINTMENT (OUTPATIENT)
Dept: RADIOLOGY | Facility: HOSPITAL | Age: 64
End: 2024-05-26
Payer: MEDICARE

## 2024-05-26 ENCOUNTER — HOSPITAL ENCOUNTER (OUTPATIENT)
Facility: HOSPITAL | Age: 64
Setting detail: OBSERVATION
LOS: 1 days | Discharge: HOME | End: 2024-05-27
Attending: STUDENT IN AN ORGANIZED HEALTH CARE EDUCATION/TRAINING PROGRAM | Admitting: INTERNAL MEDICINE
Payer: MEDICARE

## 2024-05-26 DIAGNOSIS — E86.0 DEHYDRATION: ICD-10-CM

## 2024-05-26 DIAGNOSIS — R11.2 NAUSEA AND VOMITING, UNSPECIFIED VOMITING TYPE: ICD-10-CM

## 2024-05-26 DIAGNOSIS — N17.9 AKI (ACUTE KIDNEY INJURY) (CMS-HCC): Primary | ICD-10-CM

## 2024-05-26 LAB
ALBUMIN SERPL BCP-MCNC: 3.4 G/DL (ref 3.4–5)
ALP SERPL-CCNC: 36 U/L (ref 33–136)
ALT SERPL W P-5'-P-CCNC: 18 U/L (ref 7–45)
ANION GAP SERPL CALC-SCNC: 11 MMOL/L (ref 10–20)
AST SERPL W P-5'-P-CCNC: 23 U/L (ref 9–39)
BASOPHILS # BLD AUTO: 0.08 X10*3/UL (ref 0–0.1)
BASOPHILS NFR BLD AUTO: 0.5 %
BILIRUB SERPL-MCNC: 1.6 MG/DL (ref 0–1.2)
BNP SERPL-MCNC: 481 PG/ML (ref 0–99)
BUN SERPL-MCNC: 32 MG/DL (ref 6–23)
CALCIUM SERPL-MCNC: 8.2 MG/DL (ref 8.6–10.3)
CARDIAC TROPONIN I PNL SERPL HS: 83 NG/L (ref 0–13)
CARDIAC TROPONIN I PNL SERPL HS: 85 NG/L (ref 0–13)
CHLORIDE SERPL-SCNC: 103 MMOL/L (ref 98–107)
CO2 SERPL-SCNC: 25 MMOL/L (ref 21–32)
CREAT SERPL-MCNC: 1.91 MG/DL (ref 0.5–1.05)
EGFRCR SERPLBLD CKD-EPI 2021: 29 ML/MIN/1.73M*2
EOSINOPHIL # BLD AUTO: 0.4 X10*3/UL (ref 0–0.7)
EOSINOPHIL NFR BLD AUTO: 2.6 %
ERYTHROCYTE [DISTWIDTH] IN BLOOD BY AUTOMATED COUNT: 13.2 % (ref 11.5–14.5)
FLUAV RNA RESP QL NAA+PROBE: NOT DETECTED
FLUBV RNA RESP QL NAA+PROBE: NOT DETECTED
GLUCOSE BLD MANUAL STRIP-MCNC: 96 MG/DL (ref 74–99)
GLUCOSE SERPL-MCNC: 96 MG/DL (ref 74–99)
HCT VFR BLD AUTO: 40.1 % (ref 36–46)
HGB BLD-MCNC: 13.7 G/DL (ref 12–16)
IMM GRANULOCYTES # BLD AUTO: 0.06 X10*3/UL (ref 0–0.7)
IMM GRANULOCYTES NFR BLD AUTO: 0.4 % (ref 0–0.9)
LACTATE SERPL-SCNC: 1.9 MMOL/L (ref 0.4–2)
LIPASE SERPL-CCNC: 70 U/L (ref 9–82)
LYMPHOCYTES # BLD AUTO: 2.26 X10*3/UL (ref 1.2–4.8)
LYMPHOCYTES NFR BLD AUTO: 14.9 %
MAGNESIUM SERPL-MCNC: 2.19 MG/DL (ref 1.6–2.4)
MCH RBC QN AUTO: 30.2 PG (ref 26–34)
MCHC RBC AUTO-ENTMCNC: 34.2 G/DL (ref 32–36)
MCV RBC AUTO: 88 FL (ref 80–100)
MONOCYTES # BLD AUTO: 2.09 X10*3/UL (ref 0.1–1)
MONOCYTES NFR BLD AUTO: 13.8 %
NEUTROPHILS # BLD AUTO: 10.3 X10*3/UL (ref 1.2–7.7)
NEUTROPHILS NFR BLD AUTO: 67.8 %
NRBC BLD-RTO: 0 /100 WBCS (ref 0–0)
PLATELET # BLD AUTO: 101 X10*3/UL (ref 150–450)
POTASSIUM SERPL-SCNC: 3.2 MMOL/L (ref 3.5–5.3)
PROT SERPL-MCNC: 5.9 G/DL (ref 6.4–8.2)
RBC # BLD AUTO: 4.54 X10*6/UL (ref 4–5.2)
RBC MORPH BLD: NORMAL
SARS-COV-2 RNA RESP QL NAA+PROBE: NOT DETECTED
SODIUM SERPL-SCNC: 136 MMOL/L (ref 136–145)
WBC # BLD AUTO: 15.2 X10*3/UL (ref 4.4–11.3)

## 2024-05-26 PROCEDURE — 74176 CT ABD & PELVIS W/O CONTRAST: CPT

## 2024-05-26 PROCEDURE — 99285 EMERGENCY DEPT VISIT HI MDM: CPT

## 2024-05-26 PROCEDURE — 82947 ASSAY GLUCOSE BLOOD QUANT: CPT | Mod: 59

## 2024-05-26 PROCEDURE — G0378 HOSPITAL OBSERVATION PER HR: HCPCS

## 2024-05-26 PROCEDURE — 83605 ASSAY OF LACTIC ACID: CPT | Performed by: STUDENT IN AN ORGANIZED HEALTH CARE EDUCATION/TRAINING PROGRAM

## 2024-05-26 PROCEDURE — 2060000001 HC INTERMEDIATE ICU ROOM DAILY

## 2024-05-26 PROCEDURE — 96365 THER/PROPH/DIAG IV INF INIT: CPT

## 2024-05-26 PROCEDURE — 83690 ASSAY OF LIPASE: CPT | Performed by: STUDENT IN AN ORGANIZED HEALTH CARE EDUCATION/TRAINING PROGRAM

## 2024-05-26 PROCEDURE — 96366 THER/PROPH/DIAG IV INF ADDON: CPT

## 2024-05-26 PROCEDURE — 2500000004 HC RX 250 GENERAL PHARMACY W/ HCPCS (ALT 636 FOR OP/ED): Performed by: PHYSICIAN ASSISTANT

## 2024-05-26 PROCEDURE — 99223 1ST HOSP IP/OBS HIGH 75: CPT | Performed by: PHYSICIAN ASSISTANT

## 2024-05-26 PROCEDURE — 2500000004 HC RX 250 GENERAL PHARMACY W/ HCPCS (ALT 636 FOR OP/ED): Performed by: STUDENT IN AN ORGANIZED HEALTH CARE EDUCATION/TRAINING PROGRAM

## 2024-05-26 PROCEDURE — 87086 URINE CULTURE/COLONY COUNT: CPT | Mod: PORLAB | Performed by: STUDENT IN AN ORGANIZED HEALTH CARE EDUCATION/TRAINING PROGRAM

## 2024-05-26 PROCEDURE — 87636 SARSCOV2 & INF A&B AMP PRB: CPT | Performed by: STUDENT IN AN ORGANIZED HEALTH CARE EDUCATION/TRAINING PROGRAM

## 2024-05-26 PROCEDURE — 84484 ASSAY OF TROPONIN QUANT: CPT | Performed by: STUDENT IN AN ORGANIZED HEALTH CARE EDUCATION/TRAINING PROGRAM

## 2024-05-26 PROCEDURE — 85025 COMPLETE CBC W/AUTO DIFF WBC: CPT | Performed by: STUDENT IN AN ORGANIZED HEALTH CARE EDUCATION/TRAINING PROGRAM

## 2024-05-26 PROCEDURE — 83735 ASSAY OF MAGNESIUM: CPT | Performed by: STUDENT IN AN ORGANIZED HEALTH CARE EDUCATION/TRAINING PROGRAM

## 2024-05-26 PROCEDURE — 74176 CT ABD & PELVIS W/O CONTRAST: CPT | Performed by: STUDENT IN AN ORGANIZED HEALTH CARE EDUCATION/TRAINING PROGRAM

## 2024-05-26 PROCEDURE — 80053 COMPREHEN METABOLIC PANEL: CPT | Performed by: STUDENT IN AN ORGANIZED HEALTH CARE EDUCATION/TRAINING PROGRAM

## 2024-05-26 PROCEDURE — 2500000002 HC RX 250 W HCPCS SELF ADMINISTERED DRUGS (ALT 637 FOR MEDICARE OP, ALT 636 FOR OP/ED): Mod: MUE | Performed by: STUDENT IN AN ORGANIZED HEALTH CARE EDUCATION/TRAINING PROGRAM

## 2024-05-26 PROCEDURE — 93005 ELECTROCARDIOGRAM TRACING: CPT

## 2024-05-26 PROCEDURE — 83880 ASSAY OF NATRIURETIC PEPTIDE: CPT | Performed by: STUDENT IN AN ORGANIZED HEALTH CARE EDUCATION/TRAINING PROGRAM

## 2024-05-26 PROCEDURE — 72125 CT NECK SPINE W/O DYE: CPT | Performed by: RADIOLOGY

## 2024-05-26 PROCEDURE — 81001 URINALYSIS AUTO W/SCOPE: CPT | Performed by: STUDENT IN AN ORGANIZED HEALTH CARE EDUCATION/TRAINING PROGRAM

## 2024-05-26 PROCEDURE — 36415 COLL VENOUS BLD VENIPUNCTURE: CPT | Performed by: STUDENT IN AN ORGANIZED HEALTH CARE EDUCATION/TRAINING PROGRAM

## 2024-05-26 PROCEDURE — 71046 X-RAY EXAM CHEST 2 VIEWS: CPT

## 2024-05-26 PROCEDURE — 84484 ASSAY OF TROPONIN QUANT: CPT | Mod: 91 | Performed by: STUDENT IN AN ORGANIZED HEALTH CARE EDUCATION/TRAINING PROGRAM

## 2024-05-26 PROCEDURE — 70450 CT HEAD/BRAIN W/O DYE: CPT

## 2024-05-26 PROCEDURE — 71046 X-RAY EXAM CHEST 2 VIEWS: CPT | Performed by: RADIOLOGY

## 2024-05-26 PROCEDURE — 96361 HYDRATE IV INFUSION ADD-ON: CPT

## 2024-05-26 PROCEDURE — 72125 CT NECK SPINE W/O DYE: CPT

## 2024-05-26 PROCEDURE — 96374 THER/PROPH/DIAG INJ IV PUSH: CPT

## 2024-05-26 PROCEDURE — 70450 CT HEAD/BRAIN W/O DYE: CPT | Performed by: RADIOLOGY

## 2024-05-26 PROCEDURE — 80307 DRUG TEST PRSMV CHEM ANLYZR: CPT | Performed by: PHYSICIAN ASSISTANT

## 2024-05-26 RX ORDER — ASPIRIN 81 MG/1
81 TABLET ORAL DAILY
Status: DISCONTINUED | OUTPATIENT
Start: 2024-05-26 | End: 2024-05-27 | Stop reason: HOSPADM

## 2024-05-26 RX ORDER — DEXTROSE 50 % IN WATER (D50W) INTRAVENOUS SYRINGE
12.5
Status: DISCONTINUED | OUTPATIENT
Start: 2024-05-26 | End: 2024-05-27 | Stop reason: HOSPADM

## 2024-05-26 RX ORDER — ONDANSETRON HYDROCHLORIDE 2 MG/ML
4 INJECTION, SOLUTION INTRAVENOUS EVERY 4 HOURS PRN
Status: DISCONTINUED | OUTPATIENT
Start: 2024-05-26 | End: 2024-05-27 | Stop reason: HOSPADM

## 2024-05-26 RX ORDER — ACETAMINOPHEN 160 MG/5ML
650 SOLUTION ORAL EVERY 4 HOURS PRN
Status: DISCONTINUED | OUTPATIENT
Start: 2024-05-26 | End: 2024-05-27 | Stop reason: HOSPADM

## 2024-05-26 RX ORDER — CARVEDILOL 25 MG/1
50 TABLET ORAL 2 TIMES DAILY
Status: DISCONTINUED | OUTPATIENT
Start: 2024-05-26 | End: 2024-05-27 | Stop reason: HOSPADM

## 2024-05-26 RX ORDER — POTASSIUM CHLORIDE 750 MG/1
40 TABLET, FILM COATED, EXTENDED RELEASE ORAL ONCE
Status: COMPLETED | OUTPATIENT
Start: 2024-05-26 | End: 2024-05-26

## 2024-05-26 RX ORDER — SODIUM CHLORIDE 9 MG/ML
100 INJECTION, SOLUTION INTRAVENOUS CONTINUOUS
Status: DISCONTINUED | OUTPATIENT
Start: 2024-05-26 | End: 2024-05-27 | Stop reason: HOSPADM

## 2024-05-26 RX ORDER — NITROGLYCERIN 0.4 MG/1
0.4 TABLET SUBLINGUAL EVERY 5 MIN PRN
Status: DISCONTINUED | OUTPATIENT
Start: 2024-05-26 | End: 2024-05-27 | Stop reason: HOSPADM

## 2024-05-26 RX ORDER — PANTOPRAZOLE SODIUM 40 MG/1
40 TABLET, DELAYED RELEASE ORAL
Status: DISCONTINUED | OUTPATIENT
Start: 2024-05-27 | End: 2024-05-27 | Stop reason: HOSPADM

## 2024-05-26 RX ORDER — TALC
3 POWDER (GRAM) TOPICAL NIGHTLY PRN
Status: DISCONTINUED | OUTPATIENT
Start: 2024-05-26 | End: 2024-05-27 | Stop reason: HOSPADM

## 2024-05-26 RX ORDER — ACETAMINOPHEN 325 MG/1
650 TABLET ORAL EVERY 4 HOURS PRN
Status: DISCONTINUED | OUTPATIENT
Start: 2024-05-26 | End: 2024-05-27 | Stop reason: HOSPADM

## 2024-05-26 RX ORDER — DEXTROSE 50 % IN WATER (D50W) INTRAVENOUS SYRINGE
25
Status: DISCONTINUED | OUTPATIENT
Start: 2024-05-26 | End: 2024-05-27 | Stop reason: HOSPADM

## 2024-05-26 RX ORDER — INSULIN LISPRO 100 [IU]/ML
0-10 INJECTION, SOLUTION INTRAVENOUS; SUBCUTANEOUS
Status: DISCONTINUED | OUTPATIENT
Start: 2024-05-27 | End: 2024-05-27 | Stop reason: HOSPADM

## 2024-05-26 RX ORDER — HEPARIN SODIUM 5000 [USP'U]/ML
5000 INJECTION, SOLUTION INTRAVENOUS; SUBCUTANEOUS EVERY 8 HOURS SCHEDULED
Status: DISCONTINUED | OUTPATIENT
Start: 2024-05-26 | End: 2024-05-27 | Stop reason: HOSPADM

## 2024-05-26 RX ORDER — POLYETHYLENE GLYCOL 3350 17 G/17G
17 POWDER, FOR SOLUTION ORAL DAILY
Status: DISCONTINUED | OUTPATIENT
Start: 2024-05-26 | End: 2024-05-27 | Stop reason: HOSPADM

## 2024-05-26 RX ORDER — ROSUVASTATIN CALCIUM 20 MG/1
40 TABLET, COATED ORAL NIGHTLY
Status: DISCONTINUED | OUTPATIENT
Start: 2024-05-26 | End: 2024-05-27 | Stop reason: HOSPADM

## 2024-05-26 RX ORDER — ACETAMINOPHEN 650 MG/1
650 SUPPOSITORY RECTAL EVERY 4 HOURS PRN
Status: DISCONTINUED | OUTPATIENT
Start: 2024-05-26 | End: 2024-05-27 | Stop reason: HOSPADM

## 2024-05-26 RX ORDER — POTASSIUM CHLORIDE 14.9 MG/ML
20 INJECTION INTRAVENOUS
Status: COMPLETED | OUTPATIENT
Start: 2024-05-26 | End: 2024-05-26

## 2024-05-26 RX ADMIN — HEPARIN SODIUM 5000 UNITS: 5000 INJECTION INTRAVENOUS; SUBCUTANEOUS at 21:22

## 2024-05-26 RX ADMIN — SODIUM CHLORIDE 100 ML/HR: 9 INJECTION, SOLUTION INTRAVENOUS at 21:18

## 2024-05-26 RX ADMIN — POTASSIUM CHLORIDE 40 MEQ: 750 TABLET, FILM COATED, EXTENDED RELEASE ORAL at 18:14

## 2024-05-26 RX ADMIN — POTASSIUM CHLORIDE 20 MEQ: 14.9 INJECTION, SOLUTION INTRAVENOUS at 18:14

## 2024-05-26 RX ADMIN — SODIUM CHLORIDE, POTASSIUM CHLORIDE, SODIUM LACTATE AND CALCIUM CHLORIDE 1000 ML: 600; 310; 30; 20 INJECTION, SOLUTION INTRAVENOUS at 18:09

## 2024-05-26 RX ADMIN — POTASSIUM CHLORIDE 20 MEQ: 14.9 INJECTION, SOLUTION INTRAVENOUS at 21:22

## 2024-05-26 RX ADMIN — SODIUM CHLORIDE, POTASSIUM CHLORIDE, SODIUM LACTATE AND CALCIUM CHLORIDE 1000 ML: 600; 310; 30; 20 INJECTION, SOLUTION INTRAVENOUS at 15:33

## 2024-05-26 SDOH — SOCIAL STABILITY: SOCIAL INSECURITY: ARE THERE ANY APPARENT SIGNS OF INJURIES/BEHAVIORS THAT COULD BE RELATED TO ABUSE/NEGLECT?: NO

## 2024-05-26 SDOH — SOCIAL STABILITY: SOCIAL INSECURITY: WERE YOU ABLE TO COMPLETE ALL THE BEHAVIORAL HEALTH SCREENINGS?: YES

## 2024-05-26 SDOH — SOCIAL STABILITY: SOCIAL INSECURITY: DO YOU FEEL UNSAFE GOING BACK TO THE PLACE WHERE YOU ARE LIVING?: NO

## 2024-05-26 SDOH — SOCIAL STABILITY: SOCIAL INSECURITY: DO YOU FEEL ANYONE HAS EXPLOITED OR TAKEN ADVANTAGE OF YOU FINANCIALLY OR OF YOUR PERSONAL PROPERTY?: NO

## 2024-05-26 SDOH — SOCIAL STABILITY: SOCIAL INSECURITY: HAVE YOU HAD THOUGHTS OF HARMING ANYONE ELSE?: NO

## 2024-05-26 SDOH — SOCIAL STABILITY: SOCIAL INSECURITY: DOES ANYONE TRY TO KEEP YOU FROM HAVING/CONTACTING OTHER FRIENDS OR DOING THINGS OUTSIDE YOUR HOME?: NO

## 2024-05-26 SDOH — SOCIAL STABILITY: SOCIAL INSECURITY: ARE YOU OR HAVE YOU BEEN THREATENED OR ABUSED PHYSICALLY, EMOTIONALLY, OR SEXUALLY BY ANYONE?: NO

## 2024-05-26 SDOH — SOCIAL STABILITY: SOCIAL INSECURITY: HAVE YOU HAD ANY THOUGHTS OF HARMING ANYONE ELSE?: NO

## 2024-05-26 SDOH — SOCIAL STABILITY: SOCIAL INSECURITY: HAS ANYONE EVER THREATENED TO HURT YOUR FAMILY OR YOUR PETS?: NO

## 2024-05-26 SDOH — SOCIAL STABILITY: SOCIAL INSECURITY: ABUSE: ADULT

## 2024-05-26 ASSESSMENT — COGNITIVE AND FUNCTIONAL STATUS - GENERAL
PATIENT BASELINE BEDBOUND: NO
WALKING IN HOSPITAL ROOM: A LITTLE
DAILY ACTIVITIY SCORE: 23
MOBILITY SCORE: 22
TOILETING: A LITTLE
CLIMB 3 TO 5 STEPS WITH RAILING: A LITTLE

## 2024-05-26 ASSESSMENT — PATIENT HEALTH QUESTIONNAIRE - PHQ9
SUM OF ALL RESPONSES TO PHQ9 QUESTIONS 1 & 2: 0
1. LITTLE INTEREST OR PLEASURE IN DOING THINGS: NOT AT ALL
2. FEELING DOWN, DEPRESSED OR HOPELESS: NOT AT ALL

## 2024-05-26 ASSESSMENT — ACTIVITIES OF DAILY LIVING (ADL)
BATHING: INDEPENDENT
HEARING - LEFT EAR: FUNCTIONAL
JUDGMENT_ADEQUATE_SAFELY_COMPLETE_DAILY_ACTIVITIES: YES
HEARING - RIGHT EAR: FUNCTIONAL
DRESSING YOURSELF: INDEPENDENT
GROOMING: INDEPENDENT
LACK_OF_TRANSPORTATION: NO
TOILETING: NEEDS ASSISTANCE
WALKS IN HOME: INDEPENDENT
ADEQUATE_TO_COMPLETE_ADL: YES
PATIENT'S MEMORY ADEQUATE TO SAFELY COMPLETE DAILY ACTIVITIES?: YES
FEEDING YOURSELF: INDEPENDENT

## 2024-05-26 ASSESSMENT — PAIN - FUNCTIONAL ASSESSMENT
PAIN_FUNCTIONAL_ASSESSMENT: 0-10

## 2024-05-26 ASSESSMENT — PAIN SCALES - GENERAL
PAINLEVEL_OUTOF10: 0 - NO PAIN

## 2024-05-26 ASSESSMENT — LIFESTYLE VARIABLES
EVER FELT BAD OR GUILTY ABOUT YOUR DRINKING: NO
AUDIT-C TOTAL SCORE: 0
AUDIT-C TOTAL SCORE: 0
HOW MANY STANDARD DRINKS CONTAINING ALCOHOL DO YOU HAVE ON A TYPICAL DAY: PATIENT DOES NOT DRINK
HAVE PEOPLE ANNOYED YOU BY CRITICIZING YOUR DRINKING: NO
HAVE YOU EVER FELT YOU SHOULD CUT DOWN ON YOUR DRINKING: NO
HOW OFTEN DO YOU HAVE 6 OR MORE DRINKS ON ONE OCCASION: NEVER
SKIP TO QUESTIONS 9-10: 1
EVER HAD A DRINK FIRST THING IN THE MORNING TO STEADY YOUR NERVES TO GET RID OF A HANGOVER: NO
HOW OFTEN DO YOU HAVE A DRINK CONTAINING ALCOHOL: NEVER
TOTAL SCORE: 0

## 2024-05-26 ASSESSMENT — COLUMBIA-SUICIDE SEVERITY RATING SCALE - C-SSRS
1. IN THE PAST MONTH, HAVE YOU WISHED YOU WERE DEAD OR WISHED YOU COULD GO TO SLEEP AND NOT WAKE UP?: NO
2. HAVE YOU ACTUALLY HAD ANY THOUGHTS OF KILLING YOURSELF?: NO
6. HAVE YOU EVER DONE ANYTHING, STARTED TO DO ANYTHING, OR PREPARED TO DO ANYTHING TO END YOUR LIFE?: NO

## 2024-05-26 NOTE — H&P
History Of Present Illness  Sophie Cunningham is a 64 y.o. female with PMH of CAD s/p PCI x 1, PPM/ICD implant, HTN, HLD, DM type II, tobacco use disorder, COPD with no baseline oxygen requirement, marijuana use, who presents with a syncopal episode.  Patient states that since Wednesday of this past week she has been having significant nausea and vomiting.  States she has been able to keep down only very little oral intake during that time.  Today she was reaching for something on the table with her daughter in the room when she suddenly fell to the floor.  States that she believes she lost consciousness as she does not recall details of the event.  She denies any preceding lightheadedness, dizziness, palpitations, shortness of breath, chest pain.  Denies any history of syncope previously.  Does report a history of nausea and vomiting however states she has never had this worked up previously.  States nausea is improved with marijuana    ED course: Afebrile, HR 60, RR 20, BP 77/44, pulse ox 93% on room air.  WBC 15.2, Hgb 13.7, platelets 101, creatinine 1.91, BUN 32, potassium 3.2.  Lactate 1.9.  Troponin 83 -> 85.  COVID/flu negative.  CT head and C-spine shows no acute intracranial process.  CT abdomen pelvis without contrast shows no acute process, does show slightly increased size of infrarenal abdominal aortic aneurysm.  Patient was given total of 3 L IVF bolus with BP improvement to 90s/50s.  ED spoke with cardiology on-call who reviewed patient's EKG states there is no ischemic process present.  Decision was made for admission      Past Medical History  She has a past medical history of Acute upper respiratory infection, unspecified (03/29/2019), Cardiac tamponade ([facility]), Chronic obstructive pulmonary disease with (acute) exacerbation ([facility]) (05/14/2018), Chronic sinusitis, unspecified (01/30/2017), Disorder of the skin and subcutaneous tissue, unspecified (09/06/2017), Personal history of other diseases of  the digestive system (07/14/2017), Personal history of other diseases of the respiratory system (03/29/2019), Personal history of other medical treatment (01/13/2017), Personal history of other medical treatment, Personal history of other specified conditions (10/21/2016), Personal history of other specified conditions (03/29/2019), and Personal history of other specified conditions (03/29/2019).    Surgical History  She has a past surgical history that includes Cervical biopsy w/ loop electrode excision (06/01/2016); Other surgical history (06/01/2016); Other surgical history (10/18/2016); and Cardiac pacemaker placement (10/12/2016).     Social History  She reports that she has been smoking cigarettes. She has never used smokeless tobacco. She reports that she does not currently use alcohol. She reports current drug use. Drug: Marijuana.    Family History  Family History   Problem Relation Name Age of Onset    Pneumonia Mother      Alcohol abuse Father      Liver disease Father      Thyroid disease Sister      Throat cancer Brother      Coronary artery disease Mother's Brother      Coronary artery disease Maternal Grandfather      Alcohol abuse Paternal Grandfather      Heart attack Other Grandfather         Allergies  Codeine    Review of Systems  12 point ROS reviewed, negative except for as noted above    Last Recorded Vitals  /62   Pulse 66   Temp 36.1 °C (97 °F)   Resp (!) 37   Wt 81.6 kg (180 lb)   SpO2 95%       Physical Exam  Constitutional: Well developed, well nourished, in no acute cardiopulmonary distress. Laying back in bed comfortably and talkative    Eyes: PERRL, EOMI    Head/Neck: Normocephalic, atraumatic. Neck supple, no thyromegaly, JVD. Trachea midline    Respiratory/Thorax: Normal respiratory effort. Lungs CTAB with no wheezing, rales, or rhonchi noted    Cardiovascular: RRR, no murmurs, rubs, or gallops noted. Peripheral pulses 2+    Gastrointestinal: Bowel sounds normal. Abdomen  soft, nontender, nondistended, with no palpable masses    Musculoskeletal: No gross deformities. No gross muscular weakness with no ROM limitation    Extremities: No lower extremity edema noted bilaterally    Neurological: Alert and oriented x3, CN II - VII grossly intact    Psychological: Appropriate mood and affect    Skin: No rashes or lesions noted.         Relevant Results  Results for orders placed or performed during the hospital encounter of 05/26/24 (from the past 24 hour(s))   Influenza A, and B PCR   Result Value Ref Range    Flu A Result Not Detected Not Detected    Flu B Result Not Detected Not Detected   Sars-CoV-2 PCR   Result Value Ref Range    Coronavirus 2019, PCR Not Detected Not Detected   CBC and Auto Differential   Result Value Ref Range    WBC 15.2 (H) 4.4 - 11.3 x10*3/uL    nRBC 0.0 0.0 - 0.0 /100 WBCs    RBC 4.54 4.00 - 5.20 x10*6/uL    Hemoglobin 13.7 12.0 - 16.0 g/dL    Hematocrit 40.1 36.0 - 46.0 %    MCV 88 80 - 100 fL    MCH 30.2 26.0 - 34.0 pg    MCHC 34.2 32.0 - 36.0 g/dL    RDW 13.2 11.5 - 14.5 %    Platelets 101 (L) 150 - 450 x10*3/uL    Neutrophils % 67.8 40.0 - 80.0 %    Immature Granulocytes %, Automated 0.4 0.0 - 0.9 %    Lymphocytes % 14.9 13.0 - 44.0 %    Monocytes % 13.8 2.0 - 10.0 %    Eosinophils % 2.6 0.0 - 6.0 %    Basophils % 0.5 0.0 - 2.0 %    Neutrophils Absolute 10.30 (H) 1.20 - 7.70 x10*3/uL    Immature Granulocytes Absolute, Automated 0.06 0.00 - 0.70 x10*3/uL    Lymphocytes Absolute 2.26 1.20 - 4.80 x10*3/uL    Monocytes Absolute 2.09 (H) 0.10 - 1.00 x10*3/uL    Eosinophils Absolute 0.40 0.00 - 0.70 x10*3/uL    Basophils Absolute 0.08 0.00 - 0.10 x10*3/uL   Magnesium   Result Value Ref Range    Magnesium 2.19 1.60 - 2.40 mg/dL   Comprehensive metabolic panel   Result Value Ref Range    Glucose 96 74 - 99 mg/dL    Sodium 136 136 - 145 mmol/L    Potassium 3.2 (L) 3.5 - 5.3 mmol/L    Chloride 103 98 - 107 mmol/L    Bicarbonate 25 21 - 32 mmol/L    Anion Gap 11 10 - 20  mmol/L    Urea Nitrogen 32 (H) 6 - 23 mg/dL    Creatinine 1.91 (H) 0.50 - 1.05 mg/dL    eGFR 29 (L) >60 mL/min/1.73m*2    Calcium 8.2 (L) 8.6 - 10.3 mg/dL    Albumin 3.4 3.4 - 5.0 g/dL    Alkaline Phosphatase 36 33 - 136 U/L    Total Protein 5.9 (L) 6.4 - 8.2 g/dL    AST 23 9 - 39 U/L    Bilirubin, Total 1.6 (H) 0.0 - 1.2 mg/dL    ALT 18 7 - 45 U/L   Lipase   Result Value Ref Range    Lipase 70 9 - 82 U/L   Lactate   Result Value Ref Range    Lactate 1.9 0.4 - 2.0 mmol/L   B-Type Natriuretic Peptide   Result Value Ref Range     (H) 0 - 99 pg/mL   Troponin I, High Sensitivity, Initial   Result Value Ref Range    Troponin I, High Sensitivity 83 (HH) 0 - 13 ng/L   Morphology   Result Value Ref Range    RBC Morphology No significant RBC morphology present    Troponin, High Sensitivity, 1 Hour   Result Value Ref Range    Troponin I, High Sensitivity 85 (HH) 0 - 13 ng/L      CT abdomen pelvis wo IV contrast    Result Date: 5/26/2024  Interpreted By:  Sabino Jensen, STUDY: CT ABDOMEN PELVIS WO IV CONTRAST;  5/26/2024 5:04 pm   INDICATION: Signs/Symptoms:abdominal pain.   COMPARISON: 05/16/2020   ACCESSION NUMBER(S): [id number]   ORDERING CLINICIAN: WILFRED MONTES   TECHNIQUE: Contiguous axial images of the abdomen and pelvis were obtained without intravenous contrast. Coronal and sagittal reformatted images were reconstructed from the axial data.   FINDINGS: Note: The absence of intravenous contrast limits evaluation of the solid organs and vasculature.   LOWER CHEST: No acute abnormality.     ABDOMEN/PELVIS:   ABDOMINAL WALL: No significant abnormality.   LIVER: The liver demonstrates a normal noncontrast attenuation.   BILE DUCTS: No significant intrahepatic or extrahepatic dilatation.   GALLBLADDER: No significant abnormality.   PANCREAS: No significant abnormality.   SPLEEN: Normal size. Calcified granuloma.   ADRENALS: No significant abnormality.   KIDNEYS, URETERS, BLADDER:  No nephroureterolithiasis  or hydroureteronephrosis. The bladder wall is normal thickness for degree of distention.   REPRODUCTIVE ORGANS: No significant abnormality.   VESSELS: Redemonstration of an aneurysmal dilatation infrarenal abdominal aorta, slightly increased from prior study, currently 3.1 cm x 3 cm (previously 2.9 cm x 2.7 cm). Moderate aorto bi iliac atherosclerosis.   RETROPERITONEUM/LYMPH NODES: No enlarged lymph nodes. No acute retroperitoneal abnormality.   BOWEL/MESENTERY/PERITONEUM:  No inflammatory bowel wall thickening or dilatation. Normal appendix.   No ascites, free air, or fluid collection.     MUSCULOSKELETAL: No acute osseous abnormality. No suspicious osseous lesion.       1. No acute process in the abdomen or pelvis.   2. Slight increased size of mildly aneurysmal infrarenal abdominal aorta measuring 3.1 x 3 cm, previously 2.9 cm x 2.7 cm (05/16/2020)     MACRO: None.   Signed by: Sabino Jensen 5/26/2024 5:44 PM Dictation workstation:   [id number]    CT head wo IV contrast    Result Date: 5/26/2024  Interpreted By:  Sabino Morris, STUDY: CT HEAD WO IV CONTRAST; CT CERVICAL SPINE WO IV CONTRAST;  5/26/2024 5:04 pm   INDICATION: Signs/Symptoms:fall.   COMPARISON: None.   ACCESSION NUMBER(S): [id number]; [id number]   ORDERING CLINICIAN: WILFRED MONTES   TECHNIQUE: Routine axial images were obtained through the calvarium. Sagittal and coronal reconstruction images were generated. Bone, subdural, and brain windows were reviewed.   Thin-section axial images were obtained through the cervical spine. Sagittal and coronal reconstruction images were generated. Bone and soft tissue windows were reviewed.     FINDINGS: PARANASAL SINUSES: The paranasal sinuses were clear. There was no deviation of the nasal septum. The infundibulum of each ostiomeatal complex was patent. No fluid level in the paranasal sinuses.   CERVICAL SPINE, NECK, AND SKULL BASE: There is moderate disc space narrowing with endplate  osteophytosis at C5-6 and C6-7. Mild changes at C4-5. Slight reversal of normal cervical lordosis centered at C5. Mild joint space loss with spurring in the anterior superior aspect of the atlantoaxial joint. Scattered interfacet hypertrophy with spur formation. There is uncovertebral hypertrophy with spur formation on the right at C4-5 and bilaterally at C5-6 and C6-7. Incidental note of a C7 cervical rib on the right. This is a variant of normal. There was no cervical spine compression fracture. No posterior element fracture. No listhesis. No suspicious cervical adenopathy. There was no mass posteriorly in the region of the nasopharynx. Mastoid air cells were clear.   BRAIN: Ventricles and sulci are compatible with the patient's age.  There is mild patchy hypodensity throughout the deep periventricular white matter.  No acute intracranial bleed, midline shift, or focal mass effect. No depressed skull fracture. No lytic or blastic destructive calvarial bone lesion.         No depressed skull fracture. No acute intracranial bleed or focal mass effect.   DJD in the cervical spine as described. No CT evidence of cervical spine fracture in this exam.   Mild chronic white matter ischemic disease in the deep cerebral white matter.   MACRO: None   Signed by: Sabino Morris 5/26/2024 5:28 PM Dictation workstation:   [id number]    CT cervical spine wo IV contrast    Result Date: 5/26/2024  Interpreted By:  Sabino Morris, STUDY: CT HEAD WO IV CONTRAST; CT CERVICAL SPINE WO IV CONTRAST;  5/26/2024 5:04 pm   INDICATION: Signs/Symptoms:fall.   COMPARISON: None.   ACCESSION NUMBER(S): [id number]; [id number]   ORDERING CLINICIAN: WILFRED MONTES   TECHNIQUE: Routine axial images were obtained through the calvarium. Sagittal and coronal reconstruction images were generated. Bone, subdural, and brain windows were reviewed.   Thin-section axial images were obtained through the cervical spine. Sagittal and coronal  reconstruction images were generated. Bone and soft tissue windows were reviewed.     FINDINGS: PARANASAL SINUSES: The paranasal sinuses were clear. There was no deviation of the nasal septum. The infundibulum of each ostiomeatal complex was patent. No fluid level in the paranasal sinuses.   CERVICAL SPINE, NECK, AND SKULL BASE: There is moderate disc space narrowing with endplate osteophytosis at C5-6 and C6-7. Mild changes at C4-5. Slight reversal of normal cervical lordosis centered at C5. Mild joint space loss with spurring in the anterior superior aspect of the atlantoaxial joint. Scattered interfacet hypertrophy with spur formation. There is uncovertebral hypertrophy with spur formation on the right at C4-5 and bilaterally at C5-6 and C6-7. Incidental note of a C7 cervical rib on the right. This is a variant of normal. There was no cervical spine compression fracture. No posterior element fracture. No listhesis. No suspicious cervical adenopathy. There was no mass posteriorly in the region of the nasopharynx. Mastoid air cells were clear.   BRAIN: Ventricles and sulci are compatible with the patient's age.  There is mild patchy hypodensity throughout the deep periventricular white matter.  No acute intracranial bleed, midline shift, or focal mass effect. No depressed skull fracture. No lytic or blastic destructive calvarial bone lesion.         No depressed skull fracture. No acute intracranial bleed or focal mass effect.   DJD in the cervical spine as described. No CT evidence of cervical spine fracture in this exam.   Mild chronic white matter ischemic disease in the deep cerebral white matter.   MACRO: None   Signed by: Sabino Morris 5/26/2024 5:28 PM Dictation workstation:   [id number]    XR chest 2 views    Result Date: 5/26/2024  Interpreted By:  Sabino Morris, STUDY: XR CHEST 2 VIEWS;  5/26/2024 4:44 pm   INDICATION: Signs/Symptoms:syncope.   COMPARISON: Most recent prior is from 10/18/2016.    ACCESSION NUMBER(S): [id number]   ORDERING CLINICIAN: WILFRED MONTES   TECHNIQUE: AP upright and lateral views of the chest were obtained.   FINDINGS: MEDIASTINUM/LUNGS/UCHE: Stable cardiac pacemaker on the left. No cardiomegaly, vascular congestion, or pleural effusion. No abnormal opacity in either lung worrisome for tumor or pneumonia. No pneumothorax. No tracheal deviation. No abnormal hilar fullness or gross mass on either side.   BONES: No lytic or blastic destructive bone lesion.   UPPER ABDOMEN: Grossly intact.       Stable cardiac pacemaker on the left. Otherwise, negative exam.   MACRO: None   Signed by: Sabino Morris 5/26/2024 5:21 PM Dictation workstation:   [id number]    Scheduled medications:  lactated Ringer's, 1,000 mL, intravenous, Once  lactated Ringer's, 1,000 mL, intravenous, Once      Continuous medications:     PRN medications:       Assessment and Plan  Syncope  Suspect 2/2 hypotension and dehydration  Given cardiac history will consult cardiology  Monitor on telemetry    Intractable nausea and vomiting  Concern for cannabis hyperemesis syndrome  As needed antiemetics  Check urine tox screen  IVF hydration  Diet as tolerated    JERARDO  Presumed 2/2 dehydration with nausea vomiting  Continue with IVF hydration  Hold home lisinopril  Monitor kidney function    Elevated troponin  Suspect 2/2 hypotension and JERARDO  Consult cardiology as above  Monitor on telemetry    Hypokalemia  2/2 nausea and vomiting  Replaced  Recheck in a.m.    Leukocytosis  Presumed reactive  Will check UA  Continue to monitor    COPD  No baseline oxygen requirement    DM II  Holding home Jardiance, cover with SSI  Monitor glucose and adjust meds as needed    HTN  Hold lisinopril with JERARDO /hypotension  Monitor BP and adjust meds as needed    HLD  Continue home Rosuvastatin    Infrarenal abdominal aortic aneurysm  Slightly increased from 2020  Outpatient monitoring    Tobacco use disorder  Smoking cessation  education    Marijuana use  Suspect etiology by nausea and vomiting  Cessation education    DVT ppx  SCDs, SQ Heparin     JUDITH YipC

## 2024-05-26 NOTE — ED NOTES
This is a 64-year-old female presents to the emergency department for syncope she has a past medical history of heart failure, coronary artery disease, mitral and tricuspid regurg, coronary artery disease status post PCI in 2016 nonischemic cardiomyopathy, left bundle branch block pattern, ICD for heart failure, COPD. Patient states that she dropped something and went to pick it up and when she bent down she passed out. She is unsure if she hit her head or not. Patient does state that she has been vomiting since Wednesday having some GI upset. She has been unable to keep anything down.     Code Status:  No Order    HPI     Chief Complaint   Patient presents with    Syncope    Nausea    Vomiting       BP 96/55   Pulse 68   Temp 36.1 °C (97 °F)   Resp 20   Wt 81.6 kg (180 lb)   SpO2 94%     [place] Coma Scale Score: 15      LDA:   Peripheral IV 05/26/24 20 G Left Antecubital (Active)   Placement Date/Time: 05/26/24 1458   Size (Gauge): 20 G  Orientation: Left  Location: Antecubital   Number of days: 0        BACKGROUND  Past Medical History:   Diagnosis Date    Acute upper respiratory infection, unspecified 03/29/2019    Acute URI    Cardiac tamponade ([facility])     Cardiac tamponade    Chronic obstructive pulmonary disease with (acute) exacerbation (Multi) 05/14/2018    COPD with acute exacerbation    Chronic sinusitis, unspecified 01/30/2017    Clinical sinusitis    Disorder of the skin and subcutaneous tissue, unspecified 09/06/2017    Changing skin lesion    Personal history of other diseases of the digestive system 07/14/2017    History of gastroesophageal reflux (GERD)    Personal history of other diseases of the respiratory system 03/29/2019    History of acute bronchitis    Personal history of other medical treatment 01/13/2017    History of screening mammography    Personal history of other medical treatment     H/O transfusion of whole blood    Personal history of other specified conditions 10/21/2016     History of fever    Personal history of other specified conditions 03/29/2019    History of fatigue    Personal history of other specified conditions 03/29/2019    History of multiple pulmonary nodules     Past Surgical History:   Procedure Laterality Date    CARDIAC PACEMAKER PLACEMENT  10/12/2016    Pacemaker Placement    CERVICAL BIOPSY  W/ LOOP ELECTRODE EXCISION  06/01/2016    Cervical Loop Electrosurgical Excision (LEEP)    OTHER SURGICAL HISTORY  06/01/2016    Surgery    OTHER SURGICAL HISTORY  10/18/2016    Implantable Cardioverter-Defibrillator     No current facility-administered medications on file prior to encounter.     Current Outpatient Medications on File Prior to Encounter   Medication Sig Dispense Refill    albuterol 2.5 mg /3 mL (0.083 %) nebulizer solution Take 3 mL (2.5 mg) by nebulization every 6 hours if needed.      albuterol 90 mcg/actuation inhaler Inhale 1 puff every 4 hours if needed.      aspirin 81 mg EC tablet Take 1 tablet (81 mg) by mouth once daily.      blood sugar diagnostic strip 1 strip once daily. Give what is covered by insurance 100 strip 3    blood-glucose meter misc 1 each once daily. Give what is covered by insurance 1 each 0    carvedilol (Coreg) 25 mg tablet Take 2 tablets (50 mg) by mouth 2 times a day. COREG 25 MG TABEQUIV      empagliflozin (Jardiance) 10 mg Take 1 tablet (10 mg) by mouth once daily. 100 tablet 3    lancets misc Inject 1 Lancet under the skin once daily. Give what is covered by insurance 100 each 3    lisinopril 40 mg tablet Take 1 tablet (40 mg) by mouth once daily.      multivitamin tablet Take 1 tablet by mouth once daily.      nitroglycerin (Nitrostat) 0.4 mg SL tablet Place 1 tablet (0.4 mg) under the tongue every 5 minutes if needed for chest pain (for up to 3 doses as needed. call 911 if pain persists).      pantoprazole (ProtoNix) 40 mg EC tablet Take 1 tablet (40 mg) by mouth once daily in the morning. Take before meals. Protonix 40 mg tab  equiv 90 tablet 3    rosuvastatin (Crestor) 40 mg tablet Take 1 tablet (40 mg) by mouth once daily at bedtime. 90 tablet 3        ASSESSMENT  ED Course as of 05/26/24 1822   Sun May 26, 2024   1619 Troponin I, High Sensitivity(!!): 83  Suspect elevation secondary to her hypotension which is most likely secondary to dehydration. [CF]   1731 IMPRESSION:      No depressed skull fracture. No acute intracranial bleed or focal  mass effect.      DJD in the cervical spine as described. No CT evidence of cervical  spine fracture in this exam.      Mild chronic white matter ischemic disease in the deep cerebral white  matter.       [CF]   1754 IMPRESSION:  1. No acute process in the abdomen or pelvis.      2. Slight increased size of mildly aneurysmal infrarenal abdominal  aorta measuring 3.1 x 3 cm, previously 2.9 cm x 2.7 cm (05/16/2020)   [CF]   1812 I did speak with Dr. Austin with admission cardiology patient arrived and they do not believe that this is a STEMI. [CF]      ED Course User Index  [CF] Naomy Bolanos MD         Diagnoses as of 05/26/24 1822   JERARDO (acute kidney injury) (CMS-[facility])   Dehydration       Medications Currently Running:        Medications Given:  ED Medication Administration from 05/26/2024 1451 to 05/26/2024 1822         Date/Time Order Dose Route Action Action by     05/26/2024 1533 EDT lactated Ringer's bolus 1,000 mL 1,000 mL intravenous New Bag Santos      05/26/2024 1633 EDT lactated Ringer's bolus 1,000 mL 0 mL intravenous Stopped Santos      05/26/2024 1809 EDT lactated Ringer's bolus 1,000 mL 1,000 mL intravenous New Bag [facility]     05/26/2024 1809 EDT lactated Ringer's bolus 1,000 mL 1,000 mL intravenous New Bag [facility]      05/26/2024 1814 EDT potassium chloride 20 mEq in 100 mL IV premix 20 mEq intravenous New Bag [facility]     05/26/2024 1814 EDT potassium chloride CR (Klor-Con) ER tablet 40 mEq 40 mEq oral Given Ruby      05/26/2024 1909 EDT lactated Ringer's bolus 1,000 mL 0  mL intravenous Stopped KRISTAN Beltran     05/26/2024 1909 EDT lactated Ringer's bolus 1,000 mL 0 mL intravenous Stopped KRISTAN Beltran                 RESULTS    Imaging:  CT head wo IV contrast   Final Result        No depressed skull fracture. No acute intracranial bleed or focal   mass effect.        DJD in the cervical spine as described. No CT evidence of cervical   spine fracture in this exam.        Mild chronic white matter ischemic disease in the deep cerebral white   matter.        MACRO:   None        Signed by: Sabino Morris 5/26/2024 5:28 PM   Dictation workstation:   [id number]      CT cervical spine wo IV contrast   Final Result        No depressed skull fracture. No acute intracranial bleed or focal   mass effect.        DJD in the cervical spine as described. No CT evidence of cervical   spine fracture in this exam.        Mild chronic white matter ischemic disease in the deep cerebral white   matter.        MACRO:   None        Signed by: Sabino Morris 5/26/2024 5:28 PM   Dictation workstation:   [id number]      CT abdomen pelvis wo IV contrast   Final Result   1. No acute process in the abdomen or pelvis.        2. Slight increased size of mildly aneurysmal infrarenal abdominal   aorta measuring 3.1 x 3 cm, previously 2.9 cm x 2.7 cm (05/16/2020)             MACRO:   None.        Signed by: Sabino Jensen 5/26/2024 5:44 PM   Dictation workstation:   [id number]      XR chest 2 views   Final Result   Stable cardiac pacemaker on the left. Otherwise, negative exam.        MACRO:   None        Signed by: Sabino Morris 5/26/2024 5:21 PM   Dictation workstation:   [id number]      Point of Care Ultrasound    (Results Pending)      }  Labs ::99  Abnormal Labs Reviewed   CBC WITH AUTO DIFFERENTIAL - Abnormal; Notable for the following components:       Result Value    WBC 15.2 (*)     Platelets 101 (*)     Neutrophils Absolute 10.30 (*)     Monocytes Absolute 2.09 (*)     All other components within normal  limits   COMPREHENSIVE METABOLIC PANEL - Abnormal; Notable for the following components:    Potassium 3.2 (*)     Urea Nitrogen 32 (*)     Creatinine 1.91 (*)     eGFR 29 (*)     Calcium 8.2 (*)     Total Protein 5.9 (*)     Bilirubin, Total 1.6 (*)     All other components within normal limits   B-TYPE NATRIURETIC PEPTIDE - Abnormal; Notable for the following components:     (*)     All other components within normal limits    Narrative:        <100 pg/mL - Heart failure unlikely                  100-299 pg/mL - Intermediate probability of acute heart                                  failure exacerbation. Correlate with clinical                                  context and patient history.                    >=300 pg/mL - Heart Failure likely. Correlate with clinical                                  context and patient history.                                    BNP testing is performed using different testing methodology at [facility] than at other [facility]. Direct result comparisons should only be made within the same method.                      SERIAL TROPONIN-INITIAL - Abnormal; Notable for the following components:    Troponin I, High Sensitivity 83 (*)     All other components within normal limits    Narrative:     Less than 99th percentile of normal range cutoff-                  Female and children under 18 years old <14 ng/L; Male <21 ng/L: Negative                  Repeat testing should be performed if clinically indicated.                                     Female and children under 18 years old 14-50 ng/L; Male 21-50 ng/L:                  Consistent with possible cardiac damage and possible increased clinical                   risk. Serial measurements may help to assess extent of myocardial damage.                                     >50 ng/L: Consistent with cardiac damage, increased clinical risk and                  myocardial infarction. Serial measurements may help  assess extent of                   myocardial damage.                                      NOTE: Children less than 1 year old may have higher baseline troponin                   levels and results should be interpreted in conjunction with the overall                   clinical context.                                     NOTE: Troponin I testing is performed using a different                   testing methodology at [facility] than at other                   [facility]. Direct result comparisons should only                   be made within the same method.   SERIAL TROPONIN, 1 HOUR - Abnormal; Notable for the following components:    Troponin I, High Sensitivity 85 (*)     All other components within normal limits    Narrative:     Less than 99th percentile of normal range cutoff-                  Female and children under 18 years old <14 ng/L; Male <21 ng/L: Negative                  Repeat testing should be performed if clinically indicated.                                     Female and children under 18 years old 14-50 ng/L; Male 21-50 ng/L:                  Consistent with possible cardiac damage and possible increased clinical                   risk. Serial measurements may help to assess extent of myocardial damage.                                     >50 ng/L: Consistent with cardiac damage, increased clinical risk and                  myocardial infarction. Serial measurements may help assess extent of                   myocardial damage.                                      NOTE: Children less than 1 year old may have higher baseline troponin                   levels and results should be interpreted in conjunction with the overall                   clinical context.                                     NOTE: Troponin I testing is performed using a different                   testing methodology at [facility] than at other                   [facility]. Direct result  comparisons should only                   be made within the same method.                Rere Beltran RN  05/26/24 0583

## 2024-05-26 NOTE — ED PROVIDER NOTES
HPI   Chief Complaint   Patient presents with    Syncope    Nausea    Vomiting       This is a 64-year-old female presents to the emergency department for syncope she has a past medical history of heart failure, coronary artery disease, mitral and tricuspid regurg, coronary artery disease status post PCI in 2016 nonischemic cardiomyopathy, left bundle branch block pattern, ICD for heart failure, COPD.  Patient states that she dropped something and went to pick it up and when she bent down she passed out.  She is unsure if she hit her head or not.  Patient does state that she has been vomiting since Wednesday having some GI upset.  She has been unable to keep anything down.                          [place] Coma Scale Score: 15                  Patient History   Past Medical History:   Diagnosis Date    Acute upper respiratory infection, unspecified 03/29/2019    Acute URI    Cardiac tamponade ([facility])     Cardiac tamponade    Chronic obstructive pulmonary disease with (acute) exacerbation (Multi) 05/14/2018    COPD with acute exacerbation    Chronic sinusitis, unspecified 01/30/2017    Clinical sinusitis    Disorder of the skin and subcutaneous tissue, unspecified 09/06/2017    Changing skin lesion    Personal history of other diseases of the digestive system 07/14/2017    History of gastroesophageal reflux (GERD)    Personal history of other diseases of the respiratory system 03/29/2019    History of acute bronchitis    Personal history of other medical treatment 01/13/2017    History of screening mammography    Personal history of other medical treatment     H/O transfusion of whole blood    Personal history of other specified conditions 10/21/2016    History of fever    Personal history of other specified conditions 03/29/2019    History of fatigue    Personal history of other specified conditions 03/29/2019    History of multiple pulmonary nodules     Past Surgical History:   Procedure Laterality Date    CARDIAC  PACEMAKER PLACEMENT  10/12/2016    Pacemaker Placement    CERVICAL BIOPSY  W/ LOOP ELECTRODE EXCISION  06/01/2016    Cervical Loop Electrosurgical Excision (LEEP)    OTHER SURGICAL HISTORY  06/01/2016    Surgery    OTHER SURGICAL HISTORY  10/18/2016    Implantable Cardioverter-Defibrillator     Family History   Problem Relation Name Age of Onset    Pneumonia Mother      Alcohol abuse Father      Liver disease Father      Thyroid disease Sister      Throat cancer Brother      Coronary artery disease Mother's Brother      Coronary artery disease Maternal Grandfather      Alcohol abuse Paternal Grandfather      Heart attack Other Grandfather      Social History     Tobacco Use    Smoking status: Every Day     Current packs/day: 0.25     Types: Cigarettes    Smokeless tobacco: Never   Vaping Use    Vaping status: Every Day    Substances: Nicotine    Devices: Pre-filled or refillable cartridge   Substance Use Topics    Alcohol use: Not Currently    Drug use: Yes     Types: Marijuana       Physical Exam   ED Triage Vitals [05/26/24 1456]   Temperature Heart Rate Respirations BP   36.1 °C (97 °F) 60 20 (!) 86/47      Pulse Ox Temp src Heart Rate Source Patient Position   94 % -- -- --      BP Location FiO2 (%)     -- --       Physical Exam  Constitutional:       General: She is not in acute distress.  HENT:      Head: Normocephalic and atraumatic.      Right Ear: Tympanic membrane normal.      Left Ear: Tympanic membrane normal.      Mouth/Throat:      Mouth: Mucous membranes are moist.   Eyes:      Extraocular Movements: Extraocular movements intact.      Conjunctiva/sclera: Conjunctivae normal.      Pupils: Pupils are equal, round, and reactive to light.   Cardiovascular:      Rate and Rhythm: Normal rate and regular rhythm.      Heart sounds: No murmur heard.  Pulmonary:      Effort: Pulmonary effort is normal. No respiratory distress.      Breath sounds: Normal breath sounds. No stridor. No wheezing or rales.    Abdominal:      General: Bowel sounds are normal. There is no distension.      Tenderness: There is abdominal tenderness. There is no guarding or rebound.   Musculoskeletal:         General: No swelling, tenderness or deformity. Normal range of motion.   Skin:     General: Skin is warm and dry.      Coloration: Skin is not jaundiced.      Findings: No bruising or lesion.   Neurological:      General: No focal deficit present.      Mental Status: She is alert and oriented to person, place, and time. Mental status is at baseline.      Cranial Nerves: No cranial nerve deficit.      Motor: No weakness.   Psychiatric:         Mood and Affect: Mood normal.       Labs Reviewed   URINALYSIS WITH REFLEX CULTURE AND MICROSCOPIC    Narrative:     The following orders were created for panel order Urinalysis with Reflex Culture and Microscopic.  Procedure                               Abnormality         Status                     ---------                               -----------         ------                     Urinalysis with Reflex C...[[id number]]                                                 Extra Urine Gray Tube[[id number]]                                                         Please view results for these tests on the individual orders.   CBC WITH AUTO DIFFERENTIAL   MAGNESIUM   COMPREHENSIVE METABOLIC PANEL   LIPASE   LACTATE   TROPONIN SERIES- (INITIAL, 1 HR)    Narrative:     The following orders were created for panel order Troponin I Series, High Sensitivity (0, 1 HR).  Procedure                               Abnormality         Status                     ---------                               -----------         ------                     Troponin I, High Sensiti...[[id number]]                                                   Please view results for these tests on the individual orders.   B-TYPE NATRIURETIC PEPTIDE   INFLUENZA A AND B PCR   SARS-COV-2 PCR   URINALYSIS WITH REFLEX CULTURE AND MICROSCOPIC    EXTRA URINE GRAY TUBE   SERIAL TROPONIN-INITIAL     Point of Care Ultrasound    (Results Pending)   CT abdomen pelvis w IV contrast    (Results Pending)   XR chest 2 views    (Results Pending)   CT head wo IV contrast    (Results Pending)   CT cervical spine wo IV contrast    (Results Pending)       ED Course & [facility]   ED Course as of 05/26/24 1811   [place] May 26, 2024   1619 Troponin I, High Sensitivity(!!): 83  Suspect elevation secondary to her hypotension which is most likely secondary to dehydration. [CF]   1731 IMPRESSION:      No depressed skull fracture. No acute intracranial bleed or focal  mass effect.      DJD in the cervical spine as described. No CT evidence of cervical  spine fracture in this exam.      Mild chronic white matter ischemic disease in the deep cerebral white  matter.       [CF]   1754 IMPRESSION:  1. No acute process in the abdomen or pelvis.      2. Slight increased size of mildly aneurysmal infrarenal abdominal  aorta measuring 3.1 x 3 cm, previously 2.9 cm x 2.7 cm (05/16/2020)   [CF]      ED Course User Index  [CF] Naomy Bolanos MD         Diagnoses as of 05/26/24 1811   JERARDO (acute kidney injury) (CMS-[facility])       Medical Decision Making  This is a 64-year-old female who presents emergency department for syncopal episode.  Her EKG on my read shows atrial ventricular dual paced rhythm at a rate of 60 bpm, no signs of a STEMI here VA is 202, QTc is 597.  Patient's troponins were elevated but they remained stable at 85 and 83 suspect this is most likely secondary to her hypotension which she presented with.  She was fluid responsive to 2 L and blood pressure is now in the 100s reports improvement of symptoms.  She has been vomiting since Wednesday.  Her workup does show an JERARDO most likely from decreased volume.  Her potassium slightly low which will be repleted.  Patient does have a leukocytosis but no other signs of infection CT of her head showed no acute abnormality and chest x-ray  shows no signs of pneumonia, CT of her abdomen showed no acute abnormality she does have a slight increase in her mild aneurysm of infrarenal abdominal aorta but denies but this was causing her symptoms.  She has no abdominal pain or back pain.  Her hemoglobin has been at her baseline.  She is currently on her third liter and has been responding well.  Looking at her prior notes her blood pressure does appear to be 890/50 typically from her prior note in April and her prior cardiology note.        Procedure  Procedures     Naomy Bolanos MD  05/26/24 3283

## 2024-05-27 VITALS
SYSTOLIC BLOOD PRESSURE: 113 MMHG | TEMPERATURE: 98.4 F | HEIGHT: 66 IN | RESPIRATION RATE: 17 BRPM | WEIGHT: 152.56 LBS | OXYGEN SATURATION: 94 % | HEART RATE: 65 BPM | DIASTOLIC BLOOD PRESSURE: 69 MMHG | BODY MASS INDEX: 24.52 KG/M2

## 2024-05-27 LAB
ALBUMIN SERPL BCP-MCNC: 2.8 G/DL (ref 3.4–5)
ALP SERPL-CCNC: 29 U/L (ref 33–136)
ALT SERPL W P-5'-P-CCNC: 15 U/L (ref 7–45)
AMPHETAMINES UR QL SCN: ABNORMAL
ANION GAP SERPL CALC-SCNC: 8 MMOL/L (ref 10–20)
APPEARANCE UR: CLEAR
AST SERPL W P-5'-P-CCNC: 17 U/L (ref 9–39)
BACTERIA #/AREA URNS AUTO: ABNORMAL /HPF
BARBITURATES UR QL SCN: ABNORMAL
BENZODIAZ UR QL SCN: ABNORMAL
BILIRUB SERPL-MCNC: 1.4 MG/DL (ref 0–1.2)
BILIRUB UR STRIP.AUTO-MCNC: NEGATIVE MG/DL
BUN SERPL-MCNC: 19 MG/DL (ref 6–23)
BZE UR QL SCN: ABNORMAL
CALCIUM SERPL-MCNC: 7.7 MG/DL (ref 8.6–10.3)
CANNABINOIDS UR QL SCN: ABNORMAL
CHLORIDE SERPL-SCNC: 117 MMOL/L (ref 98–107)
CO2 SERPL-SCNC: 21 MMOL/L (ref 21–32)
COLOR UR: COLORLESS
CREAT SERPL-MCNC: 0.94 MG/DL (ref 0.5–1.05)
EGFRCR SERPLBLD CKD-EPI 2021: 67 ML/MIN/1.73M*2
ERYTHROCYTE [DISTWIDTH] IN BLOOD BY AUTOMATED COUNT: 13.6 % (ref 11.5–14.5)
FENTANYL+NORFENTANYL UR QL SCN: ABNORMAL
GLUCOSE BLD MANUAL STRIP-MCNC: 113 MG/DL (ref 74–99)
GLUCOSE BLD MANUAL STRIP-MCNC: 79 MG/DL (ref 74–99)
GLUCOSE BLD MANUAL STRIP-MCNC: 87 MG/DL (ref 74–99)
GLUCOSE SERPL-MCNC: 72 MG/DL (ref 74–99)
GLUCOSE UR STRIP.AUTO-MCNC: ABNORMAL MG/DL
HCT VFR BLD AUTO: 37.1 % (ref 36–46)
HGB BLD-MCNC: 12.5 G/DL (ref 12–16)
HOLD SPECIMEN: NORMAL
KETONES UR STRIP.AUTO-MCNC: NEGATIVE MG/DL
LEUKOCYTE ESTERASE UR QL STRIP.AUTO: ABNORMAL
MCH RBC QN AUTO: 30.4 PG (ref 26–34)
MCHC RBC AUTO-ENTMCNC: 33.7 G/DL (ref 32–36)
MCV RBC AUTO: 90 FL (ref 80–100)
METHADONE UR QL SCN: ABNORMAL
NITRITE UR QL STRIP.AUTO: NEGATIVE
NRBC BLD-RTO: 0 /100 WBCS (ref 0–0)
OPIATES UR QL SCN: ABNORMAL
OXYCODONE+OXYMORPHONE UR QL SCN: ABNORMAL
PCP UR QL SCN: ABNORMAL
PH UR STRIP.AUTO: 6 [PH]
PLATELET # BLD AUTO: 90 X10*3/UL (ref 150–450)
POTASSIUM SERPL-SCNC: 3.5 MMOL/L (ref 3.5–5.3)
PROT SERPL-MCNC: 4.7 G/DL (ref 6.4–8.2)
PROT UR STRIP.AUTO-MCNC: NEGATIVE MG/DL
RBC # BLD AUTO: 4.11 X10*6/UL (ref 4–5.2)
RBC # UR STRIP.AUTO: NEGATIVE /UL
RBC #/AREA URNS AUTO: ABNORMAL /HPF
SODIUM SERPL-SCNC: 142 MMOL/L (ref 136–145)
SP GR UR STRIP.AUTO: 1
SQUAMOUS #/AREA URNS AUTO: ABNORMAL /HPF
UROBILINOGEN UR STRIP.AUTO-MCNC: NORMAL MG/DL
WBC # BLD AUTO: 9.5 X10*3/UL (ref 4.4–11.3)
WBC #/AREA URNS AUTO: ABNORMAL /HPF

## 2024-05-27 PROCEDURE — 80053 COMPREHEN METABOLIC PANEL: CPT | Performed by: PHYSICIAN ASSISTANT

## 2024-05-27 PROCEDURE — G0378 HOSPITAL OBSERVATION PER HR: HCPCS

## 2024-05-27 PROCEDURE — 82947 ASSAY GLUCOSE BLOOD QUANT: CPT

## 2024-05-27 PROCEDURE — 36415 COLL VENOUS BLD VENIPUNCTURE: CPT | Performed by: PHYSICIAN ASSISTANT

## 2024-05-27 PROCEDURE — 2500000001 HC RX 250 WO HCPCS SELF ADMINISTERED DRUGS (ALT 637 FOR MEDICARE OP): Performed by: PHYSICIAN ASSISTANT

## 2024-05-27 PROCEDURE — 99222 1ST HOSP IP/OBS MODERATE 55: CPT | Performed by: INTERNAL MEDICINE

## 2024-05-27 PROCEDURE — 99239 HOSP IP/OBS DSCHRG MGMT >30: CPT | Performed by: INTERNAL MEDICINE

## 2024-05-27 PROCEDURE — 82947 ASSAY GLUCOSE BLOOD QUANT: CPT | Mod: 91

## 2024-05-27 PROCEDURE — 84075 ASSAY ALKALINE PHOSPHATASE: CPT | Performed by: PHYSICIAN ASSISTANT

## 2024-05-27 PROCEDURE — 85027 COMPLETE CBC AUTOMATED: CPT | Performed by: PHYSICIAN ASSISTANT

## 2024-05-27 PROCEDURE — 2500000004 HC RX 250 GENERAL PHARMACY W/ HCPCS (ALT 636 FOR OP/ED): Performed by: PHYSICIAN ASSISTANT

## 2024-05-27 RX ORDER — ONDANSETRON 4 MG/1
4 TABLET, ORALLY DISINTEGRATING ORAL EVERY 8 HOURS PRN
Qty: 20 TABLET | Refills: 0 | Status: SHIPPED | OUTPATIENT
Start: 2024-05-27

## 2024-05-27 RX ADMIN — SODIUM CHLORIDE 100 ML/HR: 9 INJECTION, SOLUTION INTRAVENOUS at 16:02

## 2024-05-27 RX ADMIN — ASPIRIN 81 MG: 81 TABLET, COATED ORAL at 08:28

## 2024-05-27 RX ADMIN — HEPARIN SODIUM 5000 UNITS: 5000 INJECTION INTRAVENOUS; SUBCUTANEOUS at 06:39

## 2024-05-27 RX ADMIN — HEPARIN SODIUM 5000 UNITS: 5000 INJECTION INTRAVENOUS; SUBCUTANEOUS at 14:49

## 2024-05-27 RX ADMIN — PANTOPRAZOLE SODIUM 40 MG: 40 TABLET, DELAYED RELEASE ORAL at 06:39

## 2024-05-27 RX ADMIN — SODIUM CHLORIDE 100 ML/HR: 9 INJECTION, SOLUTION INTRAVENOUS at 06:39

## 2024-05-27 RX ADMIN — CARVEDILOL 50 MG: 25 TABLET, FILM COATED ORAL at 08:28

## 2024-05-27 ASSESSMENT — COGNITIVE AND FUNCTIONAL STATUS - GENERAL
DAILY ACTIVITIY SCORE: 24
CLIMB 3 TO 5 STEPS WITH RAILING: A LITTLE
MOBILITY SCORE: 23

## 2024-05-27 ASSESSMENT — PAIN SCALES - GENERAL
PAINLEVEL_OUTOF10: 0 - NO PAIN

## 2024-05-27 ASSESSMENT — PAIN - FUNCTIONAL ASSESSMENT
PAIN_FUNCTIONAL_ASSESSMENT: 0-10

## 2024-05-27 NOTE — CONSULTS
Inpatient consult to Cardiology  Consult performed by: Wood Davalos MD  Consult ordered by: Eliel Sanchez PA-C  Reason for consult: Syncope, elevated cardiac enzymes        History Of Present Illness:    Sophie Cunningham is a 64 y.o. female presenting with loss of consciousness.  She states she is having nausea vomiting and diarrhea for 4 days and yesterday was feeling very weak and while standing talking to the daughter she passed out.  She was extremely hypotensive initially now blood pressure has improved with hydration.    No chest pain or shortness of breath.  ECG personally reviewed, AV paced rhythm with prolonged QTc and abnormal T waves anterolaterally.    Her PMH is significant for CAD s/p PCI of OM1 in 2016 (appears to have presented as STEMI per chart review), nonischemic cardiomyopathy, LBBB, chronic systolic heart failure s/p Bi-V ICD in 2016, hyperlipidemia, prediabetes, thrombocytopenia and nicotine dependence. Echocardiogram performed 02/14/2024 demonstrated an EF of 45-50%, aneurysmal mid inferolateral wall, moderate tricuspid regurgitation and mild mitral regurgitation.      Last Recorded Vitals:  Vitals:    05/27/24 0002 05/27/24 0411 05/27/24 0742 05/27/24 1127   BP: 92/56 113/69 122/69 118/73   BP Location: Left arm Left arm Left arm Left arm   Patient Position: Lying Lying Lying Lying   Pulse:  62 61 64   Resp:  18 17 16   Temp:  36.2 °C (97.2 °F) 37.2 °C (99 °F) 37.2 °C (98.9 °F)   TempSrc:  Temporal Temporal Temporal   SpO2:  97% 95% 96%   Weight:  69.2 kg (152 lb 8.9 oz)     Height:           Last Labs:  CBC - 5/27/2024:  5:13 AM  9.5 12.5 90    37.1      CMP - 5/27/2024:  5:13 AM  7.7 4.7 17 --- 1.4   _ 2.8 15 29      PTT - No results in last year.  _   _ _     Troponin I, High Sensitivity   Date/Time Value Ref Range Status   05/26/2024 04:29 PM 85 (HH) 0 - 13 ng/L Final     Comment:     Previous result verified on 5/26/2024 1613 on specimen/case [id number] called with  component [facility] for procedure Troponin I, High Sensitivity, Initial with value 83 ng/L.   05/26/2024 03:31 PM 83 (HH) 0 - 13 ng/L Final     BNP   Date/Time Value Ref Range Status   05/26/2024 03:31  (H) 0 - 99 pg/mL Final     Hemoglobin A1C   Date/Time Value Ref Range Status   04/26/2024 08:39 AM 6.7 (H) see below % Final   05/08/2023 08:40 AM 6.6 (A) % Final     Comment:          Diagnosis of Diabetes-Adults   Non-Diabetic: < or = 5.6%   Increased risk for developing diabetes: 5.7-6.4%   Diagnostic of diabetes: > or = 6.5%  .       Monitoring of Diabetes                Age (y)     Therapeutic Goal (%)   Adults:          >18           <7.0   Pediatrics:    13-18           <7.5                   7-12           <8.0                   0- 6            7.5-8.5   American Diabetes Association. Diabetes Care 33(S1), Jan 2010.     LDL Calculated   Date/Time Value Ref Range Status   04/26/2024 08:39 AM 58 <=99 mg/dL Final     Comment:                                 Near   Borderline      AGE      Desirable  Optimal    High     High     Very High     0-19 Y     0 - 109     ---    110-129   >/= 130     ----    20-24 Y     0 - 119     ---    120-159   >/= 160     ----      >24 Y     0 -  99   100-129  130-159   160-189     >/=190       VLDL   Date/Time Value Ref Range Status   04/26/2024 08:39 AM 39 0 - 40 mg/dL Final   05/08/2023 08:33 AM 38 0 - 40 mg/dL Final   11/05/2018 12:00 AM 45 (H) 0 - 40 mg/dL Final   11/20/2017 11:27 AM 41 (H) 0 - 40 mg/dL Final      Last I/O:  I/O last 3 completed shifts:  In: 1320 (19.1 mL/kg) [P.O.:120; I.V.:1200 (17.3 mL/kg)]  Out: 800 (11.6 mL/kg) [Urine:800 (0.3 mL/kg/hr)]  Weight: 69.2 kg     Past Cardiology Tests (Last 3 Years):  EKG:  ECG 12 Lead 03/19/2024    Echo:  Transthoracic Echo (TTE) Complete 02/14/2024    Ejection Fractions:  EF   Date/Time Value Ref Range Status   02/14/2024 01:30 PM 56 %      Cath:  No results found for this or any previous visit from the past 1095  days.    Stress Test:  No results found for this or any previous visit from the past 1095 days.    Cardiac Imaging:  No results found for this or any previous visit from the past 1095 days.      Past Medical History:  She has a past medical history of Acute upper respiratory infection, unspecified (03/29/2019), Cardiac tamponade ([facility]), Chronic obstructive pulmonary disease with (acute) exacerbation ([facility]) (05/14/2018), Chronic sinusitis, unspecified (01/30/2017), Disorder of the skin and subcutaneous tissue, unspecified (09/06/2017), Personal history of other diseases of the digestive system (07/14/2017), Personal history of other diseases of the respiratory system (03/29/2019), Personal history of other medical treatment (01/13/2017), Personal history of other medical treatment, Personal history of other specified conditions (10/21/2016), Personal history of other specified conditions (03/29/2019), and Personal history of other specified conditions (03/29/2019).    Past Surgical History:  She has a past surgical history that includes Cervical biopsy w/ loop electrode excision (06/01/2016); Other surgical history (06/01/2016); Other surgical history (10/18/2016); and Cardiac pacemaker placement (10/12/2016).      Social History:  She reports that she has been smoking cigarettes. She has never used smokeless tobacco. She reports that she does not currently use alcohol. She reports current drug use. Drug: Marijuana.    Family History:  Family History   Problem Relation Name Age of Onset    Pneumonia Mother      Alcohol abuse Father      Liver disease Father      Thyroid disease Sister      Throat cancer Brother      Coronary artery disease Mother's Brother      Coronary artery disease Maternal Grandfather      Alcohol abuse Paternal Grandfather      Heart attack Other Grandfather         Allergies:  Codeine    Inpatient Medications:  Scheduled medications   Medication Dose Route Frequency    aspirin  81 mg oral  Daily    carvedilol  50 mg oral BID    heparin (porcine)  5,000 Units subcutaneous q8h [facility]    insulin lispro  0-10 Units subcutaneous TID    pantoprazole  40 mg oral Daily before breakfast    polyethylene glycol  17 g oral Daily    rosuvastatin  40 mg oral Nightly     PRN medications   Medication    acetaminophen    Or    acetaminophen    Or    acetaminophen    acetaminophen    Or    acetaminophen    Or    acetaminophen    dextrose    dextrose    glucagon    glucagon    melatonin    nitroglycerin    ondansetron     Continuous Medications   Medication Dose Last Rate    sodium chloride 0.9%  100 mL/hr 100 mL/hr (05/27/24 0639)     Outpatient Medications:  Current Outpatient Medications   Medication Instructions    albuterol 90 mcg/actuation inhaler 1 puff, inhalation, Every 4 hours PRN    albuterol 2.5 mg, nebulization, Every 6 hours PRN    aspirin 81 mg EC tablet 1 tablet, oral, Daily    blood sugar diagnostic strip 1 strip, miscellaneous, Daily, Give what is covered by insurance    blood-glucose meter misc 1 each, miscellaneous, Daily, Give what is covered by insurance    carvedilol (COREG) 50 mg, oral, 2 times daily, COREG 25 MG TABEQUIV    empagliflozin (JARDIANCE) 10 mg, oral, Daily    lancets misc 1 Lancet, subcutaneous, Daily, Give what is covered by insurance    lisinopril 40 mg tablet 1 tablet, oral, Daily    multivitamin tablet 1 tablet, oral, Daily    nitroglycerin (NITROSTAT) 0.4 mg, sublingual, Every 5 min PRN    pantoprazole (PROTONIX) 40 mg, oral, Daily before breakfast, Protonix 40 mg tab equiv    rosuvastatin (CRESTOR) 40 mg, oral, Nightly       Physical Exam:  Physical Exam  Vitals reviewed.   Constitutional:       Appearance: Normal appearance.   Neck:      Vascular: No carotid bruit or JVD.   Cardiovascular:      Rate and Rhythm: Normal rate and regular rhythm.      Pulses: Normal pulses.      Heart sounds: Normal heart sounds, S1 normal and S2 normal.   Pulmonary:      Effort: Pulmonary effort is  normal. No respiratory distress.      Breath sounds: No wheezing or rales.   Abdominal:      General: Abdomen is flat.      Palpations: Abdomen is soft.   Musculoskeletal:      Right lower leg: No edema.      Left lower leg: No edema.   Skin:     General: Skin is warm.   Neurological:      Mental Status: She is alert and oriented to person, place, and time. Mental status is at baseline.   Psychiatric:         Mood and Affect: Mood normal.         Behavior: Behavior normal.           Assessment/Plan   1-syncope-seems to be due to dehydration and hypotension.  Patient was extremely hypotensive upon arrival now improving with hydration.  Consider interrogating pacemaker ICD.      2-abnormal EKG elevated cardiac enzymes-no ischemic symptoms.  Borderline elevated enzymes due to nonischemic myocardial injury.    Abnormal EKG likely from low potassium suggest repleting potassium to 4 and repeating EKG.    3-coronary artery disease continue chronic medications and follow-up outpatient with primary cardiologist.  Restart cardiac medications slowly as blood pressure improves.  Cardiology will sign off.  Please call with additional questions or if ICD interrogation reveals abnormality.    Peripheral IV 05/26/24 20 G Left Antecubital (Active)   Site Assessment Clean;Dry;Intact 05/27/24 0833   Dressing Status Clean;Dry 05/27/24 0833   Number of days: 1       Peripheral IV 05/26/24 20 G Proximal;Right;Anterior Forearm (Active)   Site Assessment Clean;Dry;Intact 05/27/24 0834   Dressing Status Clean;Dry 05/27/24 0834   Number of days: 1       Code Status:  Full Code          Wood Davalos MD

## 2024-05-27 NOTE — DISCHARGE INSTRUCTIONS
Follow up with PCP in 1-2 weeks. Call to schedule. Bring all your discharge paperwork and medications when you go to your follow up appointment. Return to ED if your symptoms come back.  May want to avoid further marijuana use if symptoms recur.

## 2024-05-27 NOTE — DISCHARGE SUMMARY
Discharge Diagnosis  JERARDO (acute kidney injury) (CMS-[facility])    Issues Requiring Follow-Up  Follow-up PCP in 1 to 2 weeks.    Discharge Meds     Your medication list        CONTINUE taking these medications        Instructions Last Dose Given Next Dose Due   albuterol 90 mcg/actuation inhaler           albuterol 2.5 mg /3 mL (0.083 %) nebulizer solution           aspirin 81 mg EC tablet           blood sugar diagnostic strip      1 strip once daily. Give what is covered by insurance       blood-glucose meter misc      1 each once daily. Give what is covered by insurance       carvedilol 25 mg tablet  Commonly known as: Coreg           empagliflozin 10 mg  Commonly known as: Jardiance      Take 1 tablet (10 mg) by mouth once daily.       lancets misc      Inject 1 Lancet under the skin once daily. Give what is covered by insurance       lisinopril 40 mg tablet           multivitamin tablet           nitroglycerin 0.4 mg SL tablet  Commonly known as: Nitrostat           pantoprazole 40 mg EC tablet  Commonly known as: ProtoNix      Take 1 tablet (40 mg) by mouth once daily in the morning. Take before meals. Protonix 40 mg tab equiv       rosuvastatin 40 mg tablet  Commonly known as: Crestor      Take 1 tablet (40 mg) by mouth once daily at bedtime.                Test Results Pending At Discharge  Pending Labs       Order Current Status    Urine Culture In process            Hospital Course   Sophie Cunningham is a 64 y.o. female with PMH of CAD s/p PCI x 1, PPM/ICD implant, HTN, HLD, DM type II, tobacco use disorder, COPD with no baseline oxygen requirement, marijuana use, who presents with a syncopal episode.  Patient states that since Wednesday of this past week she has been having significant nausea and vomiting.  States she has been able to keep down only very little oral intake during that time.  Today she was reaching for something on the table with her daughter in the room when she suddenly fell to the floor.  States  that she believes she lost consciousness as she does not recall details of the event.  She denies any preceding lightheadedness, dizziness, palpitations, shortness of breath, chest pain.  Denies any history of syncope previously.  Does report a history of nausea and vomiting however states she has never had this worked up previously.  States nausea is improved with marijuana     ED course: Afebrile, HR 60, RR 20, BP 77/44, pulse ox 93% on room air.  WBC 15.2, Hgb 13.7, platelets 101, creatinine 1.91, BUN 32, potassium 3.2.  Lactate 1.9.  Troponin 83 -> 85.  COVID/flu negative.  CT head and C-spine shows no acute intracranial process.  CT abdomen pelvis without contrast shows no acute process, does show slightly increased size of infrarenal abdominal aortic aneurysm.  Patient was given total of 3 L IVF bolus with BP improvement to 90s/50s.  ED spoke with cardiology on-call who reviewed patient's EKG states there is no ischemic process present.  Decision was made for admission    5/27: Patient seen.  Nausea and vomiting have ceased.  Patient still has slightly unsettled stomach but otherwise feels better.  Denies any dysuria hematuria despite elevated WBC count.  Hold on treating at this time.  States she had about 4 days of symptoms.  Was self treating with additional marijuana doses which she thinks helps.  Suspect viral gastroenteritis plus or minus marijuana hyperemesis syndrome.  Recommend that she stop using marijuana if she has additional nausea with usage.  JERARDO appears to have resolved following IV fluid.  Okay to discharge to home.  Will prescribe some Zofran.  Follow-up with PCP in 1 to 2 weeks.    This discharge took greater than 35 minutes to arrange.    Syncope likely secondary to hypovolemia.  Intractable nausea and vomiting, suspect viral gastroenteritis plus or minus marijuana hyperemesis syndrome.  Resolved.  JERARDO secondary to hypovolemia.  Patient has improved remarkably with hydration.  Nausea  vomiting appears to have ceased as well.  Elevated troponin secondary to hypotension and JERARDO.  Seen by cardiology.  Follow-up with cardiology as outpatient.  No further workup is required at this time.  Can have device interrogated with cardiology team.  Hypokalemia.  Replaced.  DM2.  COPD.  Essential hypertension.  Infrarenal abdominal aortic aneurysm.  Slightly increased but not urgent at this time.  Follow-up with PCP.    Pertinent Physical Exam At Time of Discharge  Physical Exam  Constitutional: Well developed, well nourished, in no acute cardiopulmonary distress. Laying back in bed comfortably and talkative     Eyes: PERRL, EOMI     Head/Neck: Normocephalic, atraumatic. Neck supple, no thyromegaly, JVD. Trachea midline     Respiratory/Thorax: Normal respiratory effort. Lungs CTAB with no wheezing, rales, or rhonchi noted     Cardiovascular: RRR, no murmurs, rubs, or gallops noted. Peripheral pulses 2+     Gastrointestinal: Bowel sounds normal. Abdomen soft, nontender, nondistended, with no palpable masses     Musculoskeletal: No gross deformities. No gross muscular weakness with no ROM limitation     Extremities: No lower extremity edema noted bilaterally     Neurological: Alert and oriented x3, CN II - VII grossly intact     Psychological: Appropriate mood and affect     Skin: No rashes or lesions noted    Outpatient Follow-Up  Future Appointments   Date Time Provider Department Center   9/18/2024 11:00 AM SUNI Leiva-CNP [id number] [facility]   10/24/2024  2:40 PM SUNI Soto-CNS [id number] [facility]         Santo Palomares MD

## 2024-05-28 ENCOUNTER — PATIENT OUTREACH (OUTPATIENT)
Dept: PRIMARY CARE | Facility: CLINIC | Age: 64
End: 2024-05-28
Payer: MEDICARE

## 2024-05-28 LAB
ATRIAL RATE: 60 BPM
BACTERIA UR CULT: NO GROWTH
P AXIS: -62 DEGREES
PR INTERVAL: 202 MS
Q ONSET: 251 MS
QRS COUNT: 9 BEATS
QRS DURATION: 160 MS
QT INTERVAL: 597 MS
QTC CALCULATION(BAZETT): 597 MS
QTC FREDERICIA: 597 MS
R AXIS: 201 DEGREES
T AXIS: 88 DEGREES
T OFFSET: 550 MS
VENTRICULAR RATE: 60 BPM

## 2024-05-28 NOTE — PROGRESS NOTES
Discharge Facility: [facility]  Discharge Diagnosis: JERARDO (acute kidney injury); Nausea and vomiting, unspecified vomiting type; Dehydration  Admission Date: 5/26/2024  Discharge Date: 5/27/2024    PCP Appointment Date: TBD-patient states she prefers to call the office herself to schedule a fup appt with PCP and prefers to not have the office call her  Specialist Appointment Date:   Schedule an appointment with Alex Franklin MD (Cardiology); Follow-up for device interrogation, pacemaker.  Hospital Encounter and Summary: Linked   See discharge assessment below for further details  Engagement  Call Start Time: 1508 (5/28/2024  3:16 PM)    Medications  Medications reviewed with patient/caregiver?: Yes (meds discussed with patient) (5/28/2024  3:16 PM)  Is the patient having any side effects they believe may be caused by any medication additions or changes?: No (5/28/2024  3:16 PM)  Does the patient have all medications ordered at discharge?: Yes (5/28/2024  3:16 PM)  Prescription Comments: see med list (zofran) (5/28/2024  3:16 PM)  Is the patient taking all medications as directed (includes completed medication regime)?: Yes (5/28/2024  3:16 PM)  Care Management Interventions: Provided patient education (5/28/2024  3:16 PM)    Appointments  Does the patient have a primary care provider?: Yes (5/28/2024  3:16 PM)  Care Management Interventions: Educated patient on importance of making appointment; Advised patient to make appointment (5/28/2024  3:16 PM)  Has the patient kept scheduled appointments due by today?: Yes (5/28/2024  3:16 PM)  Care Management Interventions: Advised patient to keep appointment (5/28/2024  3:16 PM)    Self Management  What is the home health agency?: denies need (5/28/2024  3:16 PM)  What Durable Medical Equipment (DME) was ordered?: n/a (5/28/2024  3:16 PM)    Patient Teaching  Does the patient have access to their discharge instructions?: Yes (5/28/2024  3:16 PM)  Care  Management Interventions: Reviewed instructions with patient (5/28/2024  3:16 PM)  What is the patient's perception of their health status since discharge?: Improving (5/28/2024  3:16 PM)  Is the patient/caregiver able to teach back the hierarchy of who to call/visit for symptoms/problems? PCP, Specialist, Home Health nurse, Urgent Care, ED, 911: Yes (5/28/2024  3:16 PM)  Patient/Caregiver Education Comments: Successful transition of care outreach with patient. Patient reports doing well at home since discharge. New meds reviewed with patient during outreach. Patient denies CP/SOB. States she is eating and drinking better now and her appetite has improved. Denies N/V and states she has Zofran prescription if needed. Patient denies further discharge questions/concerns/needs at time of outreach call. Emphasized that follow up appts are needed after discharge with PCP and reviewed needed follow ups with any specialties to assess response to treatment from hospitalization. Patient prefers to schedule her own appt with PCP during outreach as she has some scheduling conflict and needs to arrange transportation with her daughter. Patient aware of my availability for non-emergent concerns. Contact information provided to the patient. (5/28/2024  3:16 PM)

## 2024-06-05 DIAGNOSIS — J44.9 CHRONIC OBSTRUCTIVE PULMONARY DISEASE, UNSPECIFIED COPD TYPE (MULTI): Primary | ICD-10-CM

## 2024-06-05 DIAGNOSIS — E11.9 DIABETES MELLITUS WITHOUT COMPLICATION (MULTI): ICD-10-CM

## 2024-06-11 ENCOUNTER — PATIENT OUTREACH (OUTPATIENT)
Dept: PRIMARY CARE | Facility: CLINIC | Age: 64
End: 2024-06-11
Payer: MEDICARE

## 2024-06-11 ENCOUNTER — APPOINTMENT (OUTPATIENT)
Dept: RADIOLOGY | Facility: CLINIC | Age: 64
End: 2024-06-11
Payer: MEDICARE

## 2024-06-11 NOTE — PROGRESS NOTES
Successful contact on 2 wk outreach. No follow up appt made. Pt has no questions about medications at this time. Pt states she is doing well and will make follow up appt if she feels it becomes necessary.

## 2024-06-24 ENCOUNTER — PATIENT OUTREACH (OUTPATIENT)
Dept: PRIMARY CARE | Facility: CLINIC | Age: 64
End: 2024-06-24
Payer: MEDICARE

## 2024-07-18 ENCOUNTER — APPOINTMENT (OUTPATIENT)
Dept: CARDIOLOGY | Facility: HOSPITAL | Age: 64
End: 2024-07-18
Payer: MEDICARE

## 2024-07-25 DIAGNOSIS — I42.0 DILATED CARDIOMYOPATHY (MULTI): ICD-10-CM

## 2024-07-25 DIAGNOSIS — I50.22 CHRONIC SYSTOLIC (CONGESTIVE) HEART FAILURE (MULTI): Primary | ICD-10-CM

## 2024-07-25 RX ORDER — LISINOPRIL 40 MG/1
40 TABLET ORAL DAILY
Qty: 90 TABLET | Refills: 1 | Status: SHIPPED | OUTPATIENT
Start: 2024-07-25

## 2024-07-25 RX ORDER — CARVEDILOL 25 MG/1
50 TABLET ORAL 2 TIMES DAILY
Qty: 360 TABLET | Refills: 3 | Status: SHIPPED | OUTPATIENT
Start: 2024-07-25

## 2024-08-09 ENCOUNTER — TELEPHONE (OUTPATIENT)
Dept: PRIMARY CARE | Facility: CLINIC | Age: 64
End: 2024-08-09
Payer: MEDICARE

## 2024-08-09 NOTE — TELEPHONE ENCOUNTER
Daughter dropped off FMLA for patient. 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 She is requesting Intermittent, 3-4 days a week as needed, COPD, Heart patient , gets sick easily   Start 8/4/24 End 6 months out if we can. Is this okay to fill out

## 2024-08-23 ENCOUNTER — PATIENT OUTREACH (OUTPATIENT)
Dept: PRIMARY CARE | Facility: CLINIC | Age: 64
End: 2024-08-23
Payer: MEDICARE

## 2024-08-23 NOTE — PROGRESS NOTES
90 day outreach complete. Pt questions and concerns addressed. Pt states they are doing well. Pt has graduated from [facility] program.

## 2024-08-28 ENCOUNTER — APPOINTMENT (OUTPATIENT)
Dept: CARDIOLOGY | Facility: HOSPITAL | Age: 64
End: 2024-08-28
Payer: MEDICARE

## 2024-09-03 ENCOUNTER — APPOINTMENT (OUTPATIENT)
Dept: CARDIOLOGY | Facility: HOSPITAL | Age: 64
End: 2024-09-03
Payer: MEDICARE

## 2024-09-17 ENCOUNTER — HOSPITAL ENCOUNTER (OUTPATIENT)
Dept: CARDIOLOGY | Facility: HOSPITAL | Age: 64
Discharge: HOME | End: 2024-09-17
Payer: MEDICARE

## 2024-09-17 DIAGNOSIS — Z95.810 PRESENCE OF AUTOMATIC (IMPLANTABLE) CARDIAC DEFIBRILLATOR: ICD-10-CM

## 2024-09-17 DIAGNOSIS — Z95.810 PRESENCE OF AUTOMATIC (IMPLANTABLE) CARDIAC DEFIBRILLATOR: Primary | ICD-10-CM

## 2024-09-17 DIAGNOSIS — I42.0 DILATED CARDIOMYOPATHY (MULTI): ICD-10-CM

## 2024-09-17 PROCEDURE — 93296 REM INTERROG EVL PM/IDS: CPT

## 2024-09-18 ENCOUNTER — APPOINTMENT (OUTPATIENT)
Dept: CARDIOLOGY | Facility: CLINIC | Age: 64
End: 2024-09-18
Payer: MEDICARE

## 2024-10-10 ASSESSMENT — ENCOUNTER SYMPTOMS
NAUSEA: 0
HEMATOCHEZIA: 0
FEVER: 0
NEAR-SYNCOPE: 0
SHORTNESS OF BREATH: 0
VOMITING: 0
PALPITATIONS: 0
ALTERED MENTAL STATUS: 0
IRREGULAR HEARTBEAT: 0
WHEEZING: 0
HEMATURIA: 0
CHILLS: 0
SYNCOPE: 0
DYSPNEA ON EXERTION: 1
COUGH: 0
ORTHOPNEA: 0

## 2024-10-10 NOTE — PROGRESS NOTES
Chief Complaint/Reason for Visit:  No chief complaint on file. 6 month cardiovascular follow up    History Of Present Illness:    Sophie Cunningham is a 64 y.o. female that presents to the office for 6 month follow up.    Taking medications as prescribed.     PMH significant for CAD status post PCI of the OM1 2016 (appears to have presented as STEMI per chart review), LBBB, chronic systolic heart failure s/p Bi-V ICD 2016, hyperlipidemia, DM, thrombocytopenia and nicotine dependence. She continues to smoke.    She has chronic WRIGHT when ambulating up a flight of stairs that is at baseline. Intermittent BLE edema - rarely has to take furosemide.     She has been babysitting her 3 month old grandchild. ***    Past Medical History:  She has a past medical history of Acute upper respiratory infection, unspecified (03/29/2019), Cardiac tamponade ([facility]), Chronic obstructive pulmonary disease with (acute) exacerbation (Multi) (05/14/2018), Chronic sinusitis, unspecified (01/30/2017), Disorder of the skin and subcutaneous tissue, unspecified (09/06/2017), Personal history of other diseases of the digestive system (07/14/2017), Personal history of other diseases of the respiratory system (03/29/2019), Personal history of other medical treatment (01/13/2017), Personal history of other medical treatment, Personal history of other specified conditions (10/21/2016), Personal history of other specified conditions (03/29/2019), and Personal history of other specified conditions (03/29/2019).    Past Surgical History:  She has a past surgical history that includes Cervical biopsy w/ loop electrode excision (06/01/2016); Other surgical history (06/01/2016); Other surgical history (10/18/2016); and Cardiac pacemaker placement (10/12/2016).      Social History:  She reports that she has been smoking cigarettes. She has never used smokeless tobacco. She reports that she does not currently use alcohol. She reports current drug use. Drug:  Marijuana.    Family History:  Family History   Problem Relation Name Age of Onset    Pneumonia Mother      Alcohol abuse Father      Liver disease Father      Thyroid disease Sister      Throat cancer Brother      Coronary artery disease Mother's Brother      Coronary artery disease Maternal Grandfather      Alcohol abuse Paternal Grandfather      Heart attack Other Grandfather         Allergies:  Codeine    Review of Systems   Constitutional: Negative for chills and fever.   Cardiovascular:  Positive for dyspnea on exertion (chronic) and leg swelling (intermittent). Negative for chest pain, irregular heartbeat, near-syncope, orthopnea, palpitations and syncope.   Respiratory:  Negative for cough, shortness of breath and wheezing.    Gastrointestinal:  Negative for hematochezia, melena, nausea and vomiting.   Genitourinary:  Negative for hematuria.   Psychiatric/Behavioral:  Negative for altered mental status.        Objective      Vitals reviewed.   Constitutional:       Appearance: Not in distress.   Neck:      Vascular: No carotid bruit.   Pulmonary:      Effort: Pulmonary effort is normal.      Breath sounds: Normal breath sounds.   Cardiovascular:      PMI at left midclavicular line. Normal rate. Regular rhythm. S1 with normal intensity. S2 with normal intensity.       Murmurs: There is no murmur.   Edema:     Peripheral edema absent.   Abdominal:      General: Bowel sounds are normal.   Neurological:      Mental Status: Alert and oriented to person, place and time.         Current Outpatient Medications   Medication Instructions    albuterol 90 mcg/actuation inhaler 1 puff, inhalation, Every 4 hours PRN    albuterol 2.5 mg, nebulization, Every 6 hours PRN    aspirin 81 mg EC tablet 1 tablet, oral, Daily    blood sugar diagnostic strip 1 strip, miscellaneous, Daily, Give what is covered by insurance    blood-glucose meter misc 1 each, miscellaneous, Daily, Give what is covered by insurance    carvedilol  (COREG) 50 mg, oral, 2 times daily, COREG 25 MG TABEQUIV    empagliflozin (JARDIANCE) 10 mg, oral, Daily    lancets misc 1 Lancet, subcutaneous, Daily, Give what is covered by insurance    lisinopril 40 mg, oral, Daily    multivitamin tablet 1 tablet, oral, Daily    nitroglycerin (NITROSTAT) 0.4 mg, sublingual, Every 5 min PRN    ondansetron ODT (ZOFRAN-ODT) 4 mg, oral, Every 8 hours PRN    pantoprazole (PROTONIX) 40 mg, oral, Daily before breakfast, Protonix 40 mg tab equiv    rosuvastatin (CRESTOR) 40 mg, oral, Nightly        Last Labs:  CBC -  Lab Results   Component Value Date    WBC 9.5 05/27/2024    HGB 12.5 05/27/2024    HCT 37.1 05/27/2024    MCV 90 05/27/2024    PLT 90 (L) 05/27/2024       RENAL FUNCTION PANEL -   Lab Results   Component Value Date    GLUCOSE 72 (L) 05/27/2024     05/27/2024    K 3.5 05/27/2024     (H) 05/27/2024    CO2 21 05/27/2024    ANIONGAP 8 (L) 05/27/2024    BUN 19 05/27/2024    CREATININE 0.94 05/27/2024    CALCIUM 7.7 (L) 05/27/2024    ALBUMIN 2.8 (L) 05/27/2024        CMP -  Lab Results   Component Value Date    CALCIUM 7.7 (L) 05/27/2024    PROT 4.7 (L) 05/27/2024    ALBUMIN 2.8 (L) 05/27/2024    AST 17 05/27/2024    ALT 15 05/27/2024    ALKPHOS 29 (L) 05/27/2024    BILITOT 1.4 (H) 05/27/2024       LIPID PANEL -   Lab Results   Component Value Date    CHOL 130 04/26/2024    TRIG 193 (H) 04/26/2024    HDL 33.8 04/26/2024    CHHDL 3.8 04/26/2024    LDLF 137 (H) 05/08/2023    VLDL 39 04/26/2024    NHDL 96 04/26/2024     Lab Results   Component Value Date    LDLCALC 58 04/26/2024       Lab Results   Component Value Date     (H) 05/26/2024    HGBA1C 6.7 (H) 04/26/2024       Lab Results   Component Value Date    TSH 1.01 04/26/2024       No results found for this or any previous visit.     Last Cardiology Tests:    Last Medtronic CRT-D interrogation 9/17/24    Echo 2/14/24:   1. Left ventricular systolic function is mildly decreased with a 45-50% estimated ejection  fraction.   2. Mid inferolateral segment is abnormal.   3. Aneurysmal mid inferolateral wall.   4. Spectral Doppler shows an impaired relaxation pattern of left ventricular diastolic filling.   5. Moderate tricuspid regurgitation.    LHC:   ***    Stress test:   ***   TTE 3/21/23 showed LV systolic function is moderately decreased with an EF of 35 to 40%. Impaired relaxation pattern of LV diastolic filling. Moderate mitral valve regurgitation. Moderate tricuspid regurgitation. Global hypokinesis of the LV with minor regional variations.     [facility] 6/7/19 showed mild nonobstructive CAD. Recommendations were to maximize medical therapy.     Visit Vitals  Smoking Status Every Day       Assessment/Plan   {There were no encounter diagnoses. (Refresh or delete this SmartLink)}    1. Chronic HFrEF, LBBB s/p CRT-D  Not volume overloaded on exam  Continue current medications which include:  Beta blocker:  carvedilol 50 mg BID  ACE inhibitor/ARB/ARNI: Lisinopril 40 mg daily  MRA: None  SGLT2i:  empagliflozin 10 mg daily  Diuretic: None  Hydralazine/Nitrate: None  Device: Medtronic CRT-D - implanted in 2016.  Last device interrogation Sept 2024 showed 1 year battery life remaining  TTE Feb 2024 with LVEF 45-50%     2. CAD s/p STEMI s/p PCI OM1 2016   Continue aspirin 81 mg daily  Continue rosuvastatin 40 mg daily and  carvedilol 50 mg BID  TTE Feb 2024 with LVEF 45-50%  Recommend complete smoking cessation    3. Dyslipidemia  Goal LDL <70.  Currently at goal.  Continue rosuvastatin 40 mg daily.     4. Mitral valve regurgitation, tricuspid valve regurgitation  TTE March 2023 with moderate MR and moderate TR  TTE Feb 2024 with mild MR and moderate tricuspid valve regurgitation     5. DM  Management per PCP    Leigha Castaneda, APRN-CNP

## 2024-10-15 ENCOUNTER — APPOINTMENT (OUTPATIENT)
Dept: CARDIOLOGY | Facility: HOSPITAL | Age: 64
End: 2024-10-15
Payer: MEDICARE

## 2024-10-15 DIAGNOSIS — I50.22 CHRONIC SYSTOLIC HEART FAILURE: Primary | ICD-10-CM

## 2024-10-15 DIAGNOSIS — E78.5 DYSLIPIDEMIA: ICD-10-CM

## 2024-10-15 DIAGNOSIS — I34.0 MITRAL VALVE INSUFFICIENCY, UNSPECIFIED ETIOLOGY: ICD-10-CM

## 2024-10-15 DIAGNOSIS — I25.10 CORONARY ARTERY DISEASE INVOLVING NATIVE CORONARY ARTERY OF NATIVE HEART WITHOUT ANGINA PECTORIS: ICD-10-CM

## 2024-10-16 ENCOUNTER — OFFICE VISIT (OUTPATIENT)
Dept: CARDIOLOGY | Facility: HOSPITAL | Age: 64
End: 2024-10-16
Payer: MEDICARE

## 2024-10-16 VITALS
HEIGHT: 66 IN | OXYGEN SATURATION: 97 % | SYSTOLIC BLOOD PRESSURE: 108 MMHG | BODY MASS INDEX: 25.07 KG/M2 | DIASTOLIC BLOOD PRESSURE: 64 MMHG | WEIGHT: 156 LBS | HEART RATE: 65 BPM

## 2024-10-16 DIAGNOSIS — E78.5 DYSLIPIDEMIA: ICD-10-CM

## 2024-10-16 DIAGNOSIS — I25.10 CORONARY ARTERY DISEASE INVOLVING NATIVE CORONARY ARTERY OF NATIVE HEART WITHOUT ANGINA PECTORIS: ICD-10-CM

## 2024-10-16 DIAGNOSIS — I50.22 CHRONIC SYSTOLIC (CONGESTIVE) HEART FAILURE: ICD-10-CM

## 2024-10-16 DIAGNOSIS — I50.22 CHRONIC SYSTOLIC HEART FAILURE: Primary | ICD-10-CM

## 2024-10-16 DIAGNOSIS — I42.0 DILATED CARDIOMYOPATHY (MULTI): ICD-10-CM

## 2024-10-16 DIAGNOSIS — I34.0 MITRAL VALVE INSUFFICIENCY, UNSPECIFIED ETIOLOGY: ICD-10-CM

## 2024-10-16 PROCEDURE — 99214 OFFICE O/P EST MOD 30 MIN: CPT | Performed by: NURSE PRACTITIONER

## 2024-10-16 PROCEDURE — 4010F ACE/ARB THERAPY RXD/TAKEN: CPT | Performed by: NURSE PRACTITIONER

## 2024-10-16 PROCEDURE — 1160F RVW MEDS BY RX/DR IN RCRD: CPT | Performed by: NURSE PRACTITIONER

## 2024-10-16 PROCEDURE — 3060F POS MICROALBUMINURIA REV: CPT | Performed by: NURSE PRACTITIONER

## 2024-10-16 PROCEDURE — 3074F SYST BP LT 130 MM HG: CPT | Performed by: NURSE PRACTITIONER

## 2024-10-16 PROCEDURE — 4004F PT TOBACCO SCREEN RCVD TLK: CPT | Performed by: NURSE PRACTITIONER

## 2024-10-16 PROCEDURE — 3048F LDL-C <100 MG/DL: CPT | Performed by: NURSE PRACTITIONER

## 2024-10-16 PROCEDURE — 1159F MED LIST DOCD IN RCRD: CPT | Performed by: NURSE PRACTITIONER

## 2024-10-16 PROCEDURE — 3008F BODY MASS INDEX DOCD: CPT | Performed by: NURSE PRACTITIONER

## 2024-10-16 PROCEDURE — 1123F ACP DISCUSS/DSCN MKR DOCD: CPT | Performed by: NURSE PRACTITIONER

## 2024-10-16 PROCEDURE — 3078F DIAST BP <80 MM HG: CPT | Performed by: NURSE PRACTITIONER

## 2024-10-16 PROCEDURE — 3044F HG A1C LEVEL LT 7.0%: CPT | Performed by: NURSE PRACTITIONER

## 2024-10-16 RX ORDER — PRAVASTATIN SODIUM 10 MG/1
10 TABLET ORAL DAILY
Qty: 90 TABLET | Refills: 3 | Status: SHIPPED | OUTPATIENT
Start: 2024-10-16 | End: 2025-10-16

## 2024-10-16 RX ORDER — LISINOPRIL 40 MG/1
40 TABLET ORAL DAILY
Qty: 90 TABLET | Refills: 1 | Status: SHIPPED | OUTPATIENT
Start: 2024-10-16

## 2024-10-16 SDOH — ECONOMIC STABILITY: FOOD INSECURITY: WITHIN THE PAST 12 MONTHS, YOU WORRIED THAT YOUR FOOD WOULD RUN OUT BEFORE YOU GOT MONEY TO BUY MORE.: SOMETIMES TRUE

## 2024-10-16 SDOH — ECONOMIC STABILITY: FOOD INSECURITY: WITHIN THE PAST 12 MONTHS, THE FOOD YOU BOUGHT JUST DIDN'T LAST AND YOU DIDN'T HAVE MONEY TO GET MORE.: SOMETIMES TRUE

## 2024-10-16 ASSESSMENT — ENCOUNTER SYMPTOMS
MYALGIAS: 1
SHORTNESS OF BREATH: 0
FEVER: 0
WEIGHT LOSS: 1
COUGH: 0
NAUSEA: 0
SYNCOPE: 0
VOMITING: 0
DYSPNEA ON EXERTION: 1
IRREGULAR HEARTBEAT: 0
ALTERED MENTAL STATUS: 0
CHILLS: 0
WHEEZING: 0
ORTHOPNEA: 0
HEMATURIA: 0
PALPITATIONS: 0
HEMATOCHEZIA: 0
NEAR-SYNCOPE: 0

## 2024-10-16 NOTE — PATIENT INSTRUCTIONS
Recommend Mediterranean style of eating  Stop rosuvastatin  Start pravastatin 10 mg daily  Start taking empagliflozin/Jardiance as prescribed by PCP  Check fasting lab work in 3 months  Work on quitting smoking  Follow-up with Dr. Franklin in 6 months  If you have any questions or cardiac concerns, please call our office at [phone].

## 2024-10-16 NOTE — PROGRESS NOTES
Chief Complaint/Reason for Visit:  Follow-up 6 month cardiovascular follow up    History Of Present Illness:    Sophie Cunningham is a 64 y.o. female that presents to the office for 6 month follow up.      PMH significant for CAD status post PCI of the OM1 2016 (appears to have presented as STEMI per chart review), LBBB, chronic systolic heart failure s/p Bi-V ICD 2016, hyperlipidemia, DM, thrombocytopenia and nicotine dependence. She continues to smoke, uses vape as well.    She has chronic WRIGHT when ambulating up a flight of stairs that is at baseline. Intermittent BLE edema.     She has been babysitting her 1 year old granddaughter.     She reports that she quit taking rosuvastatin about 2 months ago because of myalgias and swelling near her left ankle.  Of note, she does have varicosities BLE.  She states her symptoms improved after stopping rosuvastatin.  She is willing to try a second statin.    Past Medical History:  She has a past medical history of Acute upper respiratory infection, unspecified (03/29/2019), Cardiac tamponade, Chronic obstructive pulmonary disease with (acute) exacerbation (Multi) (05/14/2018), Chronic sinusitis, unspecified (01/30/2017), Disorder of the skin and subcutaneous tissue, unspecified (09/06/2017), Personal history of other diseases of the digestive system (07/14/2017), Personal history of other diseases of the respiratory system (03/29/2019), Personal history of other medical treatment (01/13/2017), Personal history of other medical treatment, Personal history of other specified conditions (10/21/2016), Personal history of other specified conditions (03/29/2019), and Personal history of other specified conditions (03/29/2019).    Past Surgical History:  She has a past surgical history that includes Cervical biopsy w/ loop electrode excision (06/01/2016); Other surgical history (06/01/2016); Other surgical history (10/18/2016); and Cardiac pacemaker placement (10/12/2016).       Social History:  She reports that she has been smoking cigarettes. She has never used smokeless tobacco. She reports that she does not currently use alcohol. She reports current drug use. Drug: Marijuana.    Family History:  Family History   Problem Relation Name Age of Onset    Pneumonia Mother      Alcohol abuse Father      Liver disease Father      Thyroid disease Sister      Throat cancer Brother      Coronary artery disease Mother's Brother      Coronary artery disease Maternal Grandfather      Alcohol abuse Paternal Grandfather      Heart attack Other Grandfather         Allergies:  Codeine    Review of Systems   Constitutional: Positive for weight loss (intentional - she has changed her diet and not eating much meat). Negative for chills and fever.   Cardiovascular:  Positive for dyspnea on exertion (chronic) and leg swelling (intermittent). Negative for chest pain, irregular heartbeat, near-syncope, orthopnea, palpitations and syncope.   Respiratory:  Negative for cough, shortness of breath and wheezing.    Musculoskeletal:  Positive for myalgias (better after she quit taking rosuvastatin).   Gastrointestinal:  Negative for hematochezia, melena, nausea and vomiting.   Genitourinary:  Negative for hematuria.   Psychiatric/Behavioral:  Negative for altered mental status.        Objective      Vitals reviewed.   Constitutional:       Appearance: Not in distress.   Neck:      Vascular: No carotid bruit.   Pulmonary:      Effort: Pulmonary effort is normal.      Comments: Diminished breath sounds throughout  Cardiovascular:      PMI at left midclavicular line. Normal rate. Regular rhythm. S1 with normal intensity. S2 with normal intensity.       Murmurs: There is no murmur.   Edema:     Peripheral edema absent.   Abdominal:      General: Bowel sounds are normal.   Neurological:      Mental Status: Alert and oriented to person, place and time.         Current Outpatient Medications   Medication Instructions     albuterol 90 mcg/actuation inhaler 1 puff, Every 4 hours PRN    albuterol 2.5 mg, Every 6 hours PRN    aspirin 81 mg EC tablet 1 tablet, Daily    blood sugar diagnostic strip 1 strip, miscellaneous, Daily, Give what is covered by insurance    blood-glucose meter misc 1 each, miscellaneous, Daily, Give what is covered by insurance    carvedilol (COREG) 50 mg, oral, 2 times daily, COREG 25 MG TABEQUIV    empagliflozin (JARDIANCE) 10 mg, oral, Daily    lancets misc 1 Lancet, subcutaneous, Daily, Give what is covered by insurance    lisinopril 40 mg, oral, Daily    multivitamin tablet 1 tablet, Daily    nitroglycerin (NITROSTAT) 0.4 mg, Every 5 min PRN    ondansetron ODT (ZOFRAN-ODT) 4 mg, oral, Every 8 hours PRN    pantoprazole (PROTONIX) 40 mg, oral, Daily before breakfast, Protonix 40 mg tab equiv    rosuvastatin (CRESTOR) 40 mg, oral, Nightly        Last Labs:  CBC -  Lab Results   Component Value Date    WBC 9.5 05/27/2024    HGB 12.5 05/27/2024    HCT 37.1 05/27/2024    MCV 90 05/27/2024    PLT 90 (L) 05/27/2024       RENAL FUNCTION PANEL -   Lab Results   Component Value Date    GLUCOSE 72 (L) 05/27/2024     05/27/2024    K 3.5 05/27/2024     (H) 05/27/2024    CO2 21 05/27/2024    ANIONGAP 8 (L) 05/27/2024    BUN 19 05/27/2024    CREATININE 0.94 05/27/2024    CALCIUM 7.7 (L) 05/27/2024    ALBUMIN 2.8 (L) 05/27/2024        CMP -  Lab Results   Component Value Date    CALCIUM 7.7 (L) 05/27/2024    PROT 4.7 (L) 05/27/2024    ALBUMIN 2.8 (L) 05/27/2024    AST 17 05/27/2024    ALT 15 05/27/2024    ALKPHOS 29 (L) 05/27/2024    BILITOT 1.4 (H) 05/27/2024       LIPID PANEL -   Lab Results   Component Value Date    CHOL 130 04/26/2024    TRIG 193 (H) 04/26/2024    HDL 33.8 04/26/2024    CHHDL 3.8 04/26/2024    LDLF 137 (H) 05/08/2023    VLDL 39 04/26/2024    NHDL 96 04/26/2024     Lab Results   Component Value Date    LDLCALC 58 04/26/2024       Lab Results   Component Value Date     (H) 05/26/2024     "HGBA1C 6.7 (H) 04/26/2024       Lab Results   Component Value Date    TSH 1.01 04/26/2024       No results found for this or any previous visit.     Last Cardiology Tests:    Last Medtronic CRT-D interrogation 9/17/24    Echo 2/14/24:   1. Left ventricular systolic function is mildly decreased with a 45-50% estimated ejection fraction.   2. Mid inferolateral segment is abnormal.   3. Aneurysmal mid inferolateral wall.   4. Spectral Doppler shows an impaired relaxation pattern of left ventricular diastolic filling.   5. Moderate tricuspid regurgitation.    TTE 3/21/23 showed LV systolic function is moderately decreased with an EF of 35 to 40%. Impaired relaxation pattern of LV diastolic filling. Moderate mitral valve regurgitation. Moderate tricuspid regurgitation. Global hypokinesis of the LV with minor regional variations.     C 6/7/19 showed mild nonobstructive CAD. Recommendations were to maximize medical therapy.     Visit Vitals  /64 (BP Location: Left arm, Patient Position: Sitting, BP Cuff Size: Adult)   Pulse 65   Ht 1.676 m (5' 6\")   Wt 70.8 kg (156 lb)   SpO2 97%   BMI 25.18 kg/m²   Smoking Status Every Day   BSA 1.82 m²       Assessment/Plan   The primary encounter diagnosis was Chronic systolic heart failure. Diagnoses of Coronary artery disease involving native coronary artery of native heart without angina pectoris, Dyslipidemia, and Mitral valve insufficiency, unspecified etiology were also pertinent to this visit.    1. Chronic HFrEF, LBBB s/p CRT-D  Not volume overloaded on exam  Continue current medications which include:  Beta blocker:  carvedilol 50 mg BID  ACE inhibitor/ARB/ARNI: Lisinopril 40 mg daily  MRA: None  SGLT2i:  empagliflozin 10 mg daily - encouraged her to take this and she was agreeable  Diuretic: None  Hydralazine/Nitrate: None  Device: Medtronic CRT-D - implanted in 2016.  Last device interrogation Sept 2024 showed 1 year battery life remaining  TTE Feb 2024 with LVEF " 45-50%     2. CAD s/p STEMI s/p PCI OM1 2016   Continue aspirin 81 mg daily  Continue rosuvastatin 40 mg daily and  carvedilol 50 mg BID  TTE Feb 2024 with LVEF 45-50%  Recommend complete smoking cessation    3. Dyslipidemia  Goal LDL <70.  Currently at goal on lab work April 2024, but was taking rosuvastatin  Patient stopped taking rosuvastatin d/t myalgias  Start pravastatin 10 mg daily.   If she has recurrent myalgias on pravastatin, consider PCKS9 inhibitor.  Educated that dose of pravastatin may need to be increased based on LDL.  She verbalized understanding and wants to avoid PCSK9 inhibitors.  Check fasting lab work in 3 months    4. Mitral valve regurgitation, tricuspid valve regurgitation  TTE March 2023 with moderate MR and moderate TR  TTE Feb 2024 with mild MR and moderate tricuspid valve regurgitation     5. DM  Management per PCP    eLigha Castaneda, APRN-CNP

## 2024-10-24 ENCOUNTER — APPOINTMENT (OUTPATIENT)
Dept: PRIMARY CARE | Facility: CLINIC | Age: 64
End: 2024-10-24
Payer: MEDICARE

## 2024-12-19 ENCOUNTER — APPOINTMENT (OUTPATIENT)
Dept: CARDIOLOGY | Facility: HOSPITAL | Age: 64
End: 2024-12-19
Payer: MEDICARE

## 2025-01-15 ENCOUNTER — APPOINTMENT (OUTPATIENT)
Dept: PRIMARY CARE | Facility: CLINIC | Age: 65
End: 2025-01-15
Payer: MEDICARE

## 2025-01-17 ENCOUNTER — APPOINTMENT (OUTPATIENT)
Dept: RADIOLOGY | Facility: HOSPITAL | Age: 65
End: 2025-01-17
Payer: MEDICARE

## 2025-01-17 ENCOUNTER — APPOINTMENT (OUTPATIENT)
Dept: CARDIOLOGY | Facility: HOSPITAL | Age: 65
End: 2025-01-17
Payer: MEDICARE

## 2025-01-17 ENCOUNTER — HOSPITAL ENCOUNTER (EMERGENCY)
Facility: HOSPITAL | Age: 65
Discharge: HOME | End: 2025-01-17
Attending: STUDENT IN AN ORGANIZED HEALTH CARE EDUCATION/TRAINING PROGRAM
Payer: MEDICARE

## 2025-01-17 VITALS
TEMPERATURE: 97.6 F | SYSTOLIC BLOOD PRESSURE: 123 MMHG | BODY MASS INDEX: 25.44 KG/M2 | WEIGHT: 157.6 LBS | DIASTOLIC BLOOD PRESSURE: 80 MMHG | HEART RATE: 66 BPM | OXYGEN SATURATION: 96 % | RESPIRATION RATE: 18 BRPM

## 2025-01-17 DIAGNOSIS — R11.2 NAUSEA AND VOMITING, UNSPECIFIED VOMITING TYPE: Primary | ICD-10-CM

## 2025-01-17 DIAGNOSIS — R55 SYNCOPE, UNSPECIFIED SYNCOPE TYPE: ICD-10-CM

## 2025-01-17 LAB
ALBUMIN SERPL BCP-MCNC: 3.8 G/DL (ref 3.4–5)
ALP SERPL-CCNC: 52 U/L (ref 33–136)
ALT SERPL W P-5'-P-CCNC: 17 U/L (ref 7–45)
ANION GAP SERPL CALC-SCNC: 12 MMOL/L (ref 10–20)
APPEARANCE UR: CLEAR
AST SERPL W P-5'-P-CCNC: 21 U/L (ref 9–39)
ATRIAL RATE: 76 BPM
BASOPHILS # BLD AUTO: 0.09 X10*3/UL (ref 0–0.1)
BASOPHILS NFR BLD AUTO: 0.4 %
BILIRUB SERPL-MCNC: 0.7 MG/DL (ref 0–1.2)
BILIRUB UR STRIP.AUTO-MCNC: NEGATIVE MG/DL
BUN SERPL-MCNC: 12 MG/DL (ref 6–23)
CALCIUM SERPL-MCNC: 8.7 MG/DL (ref 8.6–10.3)
CARDIAC TROPONIN I PNL SERPL HS: 12 NG/L (ref 0–13)
CHLORIDE SERPL-SCNC: 108 MMOL/L (ref 98–107)
CO2 SERPL-SCNC: 23 MMOL/L (ref 21–32)
COLOR UR: ABNORMAL
CREAT SERPL-MCNC: 0.64 MG/DL (ref 0.5–1.05)
EGFRCR SERPLBLD CKD-EPI 2021: >90 ML/MIN/1.73M*2
EOSINOPHIL # BLD AUTO: 0.24 X10*3/UL (ref 0–0.7)
EOSINOPHIL NFR BLD AUTO: 1.2 %
ERYTHROCYTE [DISTWIDTH] IN BLOOD BY AUTOMATED COUNT: 13.1 % (ref 11.5–14.5)
GLUCOSE SERPL-MCNC: 162 MG/DL (ref 74–99)
GLUCOSE UR STRIP.AUTO-MCNC: NORMAL MG/DL
HCT VFR BLD AUTO: 39.8 % (ref 36–46)
HGB BLD-MCNC: 13.7 G/DL (ref 12–16)
HOLD SPECIMEN: NORMAL
IMM GRANULOCYTES # BLD AUTO: 0.12 X10*3/UL (ref 0–0.7)
IMM GRANULOCYTES NFR BLD AUTO: 0.6 % (ref 0–0.9)
KETONES UR STRIP.AUTO-MCNC: NEGATIVE MG/DL
LEUKOCYTE ESTERASE UR QL STRIP.AUTO: NEGATIVE
LIPASE SERPL-CCNC: 72 U/L (ref 9–82)
LYMPHOCYTES # BLD AUTO: 1.55 X10*3/UL (ref 1.2–4.8)
LYMPHOCYTES NFR BLD AUTO: 7.5 %
MCH RBC QN AUTO: 31 PG (ref 26–34)
MCHC RBC AUTO-ENTMCNC: 34.4 G/DL (ref 32–36)
MCV RBC AUTO: 90 FL (ref 80–100)
MONOCYTES # BLD AUTO: 1.31 X10*3/UL (ref 0.1–1)
MONOCYTES NFR BLD AUTO: 6.3 %
NEUTROPHILS # BLD AUTO: 17.45 X10*3/UL (ref 1.2–7.7)
NEUTROPHILS NFR BLD AUTO: 84 %
NITRITE UR QL STRIP.AUTO: NEGATIVE
NRBC BLD-RTO: 0 /100 WBCS (ref 0–0)
P AXIS: 63 DEGREES
PH UR STRIP.AUTO: 8 [PH]
PLATELET # BLD AUTO: 157 X10*3/UL (ref 150–450)
POTASSIUM SERPL-SCNC: 3.9 MMOL/L (ref 3.5–5.3)
PR INTERVAL: 207 MS
PROT SERPL-MCNC: 6.4 G/DL (ref 6.4–8.2)
PROT UR STRIP.AUTO-MCNC: NEGATIVE MG/DL
Q ONSET: 256 MS
QRS COUNT: 12 BEATS
QRS DURATION: 164 MS
QT INTERVAL: 410 MS
QTC CALCULATION(BAZETT): 462 MS
QTC FREDERICIA: 443 MS
R AXIS: 222 DEGREES
RBC # BLD AUTO: 4.42 X10*6/UL (ref 4–5.2)
RBC # UR STRIP.AUTO: NEGATIVE /UL
SODIUM SERPL-SCNC: 139 MMOL/L (ref 136–145)
SP GR UR STRIP.AUTO: 1.04
T AXIS: 79 DEGREES
T OFFSET: 461 MS
UROBILINOGEN UR STRIP.AUTO-MCNC: NORMAL MG/DL
VENTRICULAR RATE: 76 BPM
WBC # BLD AUTO: 20.8 X10*3/UL (ref 4.4–11.3)

## 2025-01-17 PROCEDURE — 74177 CT ABD & PELVIS W/CONTRAST: CPT | Performed by: RADIOLOGY

## 2025-01-17 PROCEDURE — 71045 X-RAY EXAM CHEST 1 VIEW: CPT

## 2025-01-17 PROCEDURE — 93005 ELECTROCARDIOGRAM TRACING: CPT

## 2025-01-17 PROCEDURE — 74177 CT ABD & PELVIS W/CONTRAST: CPT

## 2025-01-17 PROCEDURE — 70450 CT HEAD/BRAIN W/O DYE: CPT

## 2025-01-17 PROCEDURE — 2500000002 HC RX 250 W HCPCS SELF ADMINISTERED DRUGS (ALT 637 FOR MEDICARE OP, ALT 636 FOR OP/ED): Performed by: STUDENT IN AN ORGANIZED HEALTH CARE EDUCATION/TRAINING PROGRAM

## 2025-01-17 PROCEDURE — 80053 COMPREHEN METABOLIC PANEL: CPT | Performed by: STUDENT IN AN ORGANIZED HEALTH CARE EDUCATION/TRAINING PROGRAM

## 2025-01-17 PROCEDURE — 94640 AIRWAY INHALATION TREATMENT: CPT

## 2025-01-17 PROCEDURE — 81003 URINALYSIS AUTO W/O SCOPE: CPT | Performed by: STUDENT IN AN ORGANIZED HEALTH CARE EDUCATION/TRAINING PROGRAM

## 2025-01-17 PROCEDURE — 36415 COLL VENOUS BLD VENIPUNCTURE: CPT | Performed by: STUDENT IN AN ORGANIZED HEALTH CARE EDUCATION/TRAINING PROGRAM

## 2025-01-17 PROCEDURE — 99285 EMERGENCY DEPT VISIT HI MDM: CPT | Mod: 25 | Performed by: STUDENT IN AN ORGANIZED HEALTH CARE EDUCATION/TRAINING PROGRAM

## 2025-01-17 PROCEDURE — 2550000001 HC RX 255 CONTRASTS: Performed by: STUDENT IN AN ORGANIZED HEALTH CARE EDUCATION/TRAINING PROGRAM

## 2025-01-17 PROCEDURE — 96374 THER/PROPH/DIAG INJ IV PUSH: CPT | Mod: 59

## 2025-01-17 PROCEDURE — 70450 CT HEAD/BRAIN W/O DYE: CPT | Performed by: RADIOLOGY

## 2025-01-17 PROCEDURE — 83690 ASSAY OF LIPASE: CPT | Performed by: STUDENT IN AN ORGANIZED HEALTH CARE EDUCATION/TRAINING PROGRAM

## 2025-01-17 PROCEDURE — 2500000004 HC RX 250 GENERAL PHARMACY W/ HCPCS (ALT 636 FOR OP/ED): Performed by: STUDENT IN AN ORGANIZED HEALTH CARE EDUCATION/TRAINING PROGRAM

## 2025-01-17 PROCEDURE — 71045 X-RAY EXAM CHEST 1 VIEW: CPT | Performed by: STUDENT IN AN ORGANIZED HEALTH CARE EDUCATION/TRAINING PROGRAM

## 2025-01-17 PROCEDURE — 84484 ASSAY OF TROPONIN QUANT: CPT | Performed by: STUDENT IN AN ORGANIZED HEALTH CARE EDUCATION/TRAINING PROGRAM

## 2025-01-17 PROCEDURE — 85025 COMPLETE CBC W/AUTO DIFF WBC: CPT | Performed by: STUDENT IN AN ORGANIZED HEALTH CARE EDUCATION/TRAINING PROGRAM

## 2025-01-17 PROCEDURE — 96375 TX/PRO/DX INJ NEW DRUG ADDON: CPT

## 2025-01-17 RX ORDER — ONDANSETRON HYDROCHLORIDE 2 MG/ML
4 INJECTION, SOLUTION INTRAVENOUS ONCE
Status: COMPLETED | OUTPATIENT
Start: 2025-01-17 | End: 2025-01-17

## 2025-01-17 RX ORDER — IPRATROPIUM BROMIDE AND ALBUTEROL SULFATE 2.5; .5 MG/3ML; MG/3ML
3 SOLUTION RESPIRATORY (INHALATION) ONCE
Status: COMPLETED | OUTPATIENT
Start: 2025-01-17 | End: 2025-01-17

## 2025-01-17 RX ORDER — DROPERIDOL 2.5 MG/ML
1.25 INJECTION, SOLUTION INTRAMUSCULAR; INTRAVENOUS ONCE
Status: COMPLETED | OUTPATIENT
Start: 2025-01-17 | End: 2025-01-17

## 2025-01-17 RX ADMIN — IPRATROPIUM BROMIDE AND ALBUTEROL SULFATE 3 ML: 2.5; .5 SOLUTION RESPIRATORY (INHALATION) at 09:55

## 2025-01-17 RX ADMIN — DROPERIDOL 1.25 MG: 2.5 INJECTION, SOLUTION INTRAMUSCULAR; INTRAVENOUS at 09:54

## 2025-01-17 RX ADMIN — IOHEXOL 75 ML: 350 INJECTION, SOLUTION INTRAVENOUS at 10:21

## 2025-01-17 RX ADMIN — ONDANSETRON 4 MG: 2 INJECTION INTRAMUSCULAR; INTRAVENOUS at 09:21

## 2025-01-17 ASSESSMENT — PAIN - FUNCTIONAL ASSESSMENT: PAIN_FUNCTIONAL_ASSESSMENT: 0-10

## 2025-01-17 ASSESSMENT — LIFESTYLE VARIABLES
TOTAL SCORE: 0
EVER FELT BAD OR GUILTY ABOUT YOUR DRINKING: NO
HAVE YOU EVER FELT YOU SHOULD CUT DOWN ON YOUR DRINKING: NO
HAVE PEOPLE ANNOYED YOU BY CRITICIZING YOUR DRINKING: NO
EVER HAD A DRINK FIRST THING IN THE MORNING TO STEADY YOUR NERVES TO GET RID OF A HANGOVER: NO

## 2025-01-17 ASSESSMENT — PAIN SCALES - GENERAL: PAINLEVEL_OUTOF10: 0 - NO PAIN

## 2025-01-17 NOTE — ED PROVIDER NOTES
HPI   Chief Complaint   Patient presents with    Seizures     Report from ems states family has reported seizure like activity since 4am.  No h/o seizure.  Pt was combative for ems and arrived in wrist restraints.         65-year-old female with past medical history of COPD, GERD, CHF, coronary disease presents ED with concerns for nausea and dry heaving.  Starting this morning patient was acting hysterical having nausea and right knee.  She is coughing a lot and dry heaving.  She is also acting confused per the daughter.  She says she Stick her tongue in and out.  No other tonic-clonic like movement of the nose.  No prior seizure activity.  No recent falls or head trauma.  No fever.  No dysuria or hematuria.  No abdominal pain or diarrhea.              Patient History   Past Medical History:   Diagnosis Date    Acute upper respiratory infection, unspecified 03/29/2019    Acute URI    Cardiac tamponade     Cardiac tamponade    Chronic obstructive pulmonary disease with (acute) exacerbation (Multi) 05/14/2018    COPD with acute exacerbation    Chronic sinusitis, unspecified 01/30/2017    Clinical sinusitis    Disorder of the skin and subcutaneous tissue, unspecified 09/06/2017    Changing skin lesion    Personal history of other diseases of the digestive system 07/14/2017    History of gastroesophageal reflux (GERD)    Personal history of other diseases of the respiratory system 03/29/2019    History of acute bronchitis    Personal history of other medical treatment 01/13/2017    History of screening mammography    Personal history of other medical treatment     H/O transfusion of whole blood    Personal history of other specified conditions 10/21/2016    History of fever    Personal history of other specified conditions 03/29/2019    History of fatigue    Personal history of other specified conditions 03/29/2019    History of multiple pulmonary nodules     Past Surgical History:   Procedure Laterality Date     CARDIAC PACEMAKER PLACEMENT  10/12/2016    Pacemaker Placement    CERVICAL BIOPSY  W/ LOOP ELECTRODE EXCISION  06/01/2016    Cervical Loop Electrosurgical Excision (LEEP)    OTHER SURGICAL HISTORY  06/01/2016    Surgery    OTHER SURGICAL HISTORY  10/18/2016    Implantable Cardioverter-Defibrillator     Family History   Problem Relation Name Age of Onset    Pneumonia Mother      Alcohol abuse Father      Liver disease Father      Thyroid disease Sister      Throat cancer Brother      Coronary artery disease Mother's Brother      Coronary artery disease Maternal Grandfather      Alcohol abuse Paternal Grandfather      Heart attack Other Grandfather      Social History     Tobacco Use    Smoking status: Every Day     Current packs/day: 0.25     Types: Cigarettes    Smokeless tobacco: Never   Vaping Use    Vaping status: Every Day    Substances: Nicotine    Devices: Pre-filled or refillable cartridge   Substance Use Topics    Alcohol use: Not Currently    Drug use: Yes     Types: Marijuana       Physical Exam   ED Triage Vitals [01/17/25 0856]   Temperature Heart Rate Respirations BP   36.4 °C (97.6 °F) 89 20 (!) 148/106      Pulse Ox Temp src Heart Rate Source Patient Position   99 % -- -- --      BP Location FiO2 (%)     -- --       Physical Exam  Vitals and nursing note reviewed.   Constitutional:       General: She is not in acute distress.     Appearance: She is well-developed.   HENT:      Head: Normocephalic and atraumatic.   Eyes:      Conjunctiva/sclera: Conjunctivae normal.   Cardiovascular:      Rate and Rhythm: Normal rate and regular rhythm.      Heart sounds: No murmur heard.  Pulmonary:      Effort: Pulmonary effort is normal. No respiratory distress.      Breath sounds: Normal breath sounds.   Abdominal:      Palpations: Abdomen is soft.      Tenderness: There is no abdominal tenderness.   Musculoskeletal:         General: No swelling.      Cervical back: Normal range of motion and neck supple. No  rigidity or tenderness.   Skin:     General: Skin is warm and dry.      Capillary Refill: Capillary refill takes less than 2 seconds.   Neurological:      General: No focal deficit present.      Mental Status: She is alert and oriented to person, place, and time.      Cranial Nerves: No cranial nerve deficit.      Sensory: No sensory deficit.      Motor: No weakness.      Coordination: Coordination normal.   Psychiatric:         Mood and Affect: Mood normal.           ED Course & MDM   ED Course as of 01/17/25 1251   Fri Jan 17, 2025   0927 EKG shows a paced rhythm.  Negative modified Sgarbossa criteria. [RS]      ED Course User Index  [RS] Emanuel Berumen DO         Diagnoses as of 01/17/25 1251   Nausea and vomiting, unspecified vomiting type   Syncope, unspecified syncope type                 No data recorded     [place] Coma Scale Score: 14 (01/17/25 0857 : Madelyn Ness RN)                           Medical Decision Making  HISTORIAN:  Patient, daughter    CHART REVIEW:  No pertinent findings    PT SUMMARY:  65-year-old female presents ED with dry heaving, cough, not acting right.  Vital signs stable.    DDX:  COPD exacerbation, pneumonia, pancreatitis, hepatitis, intra-abdominal infection, UTI, intracranial mass, intracranial bleed, meningitis, marijuana induced vomiting    PLAN:  Will obtain CBC, CMP, lipase, UA, chest x-ray, CT brain.  Will treat patient's symptoms with IV Zofran and droperidol along with a DuoNeb.    DISPO/RE-EVAL:  CBC showed a leukocytosis of 20,000.  Otherwise labs show no other significant abnormalities.  CT brain negative.  Chest x-ray negative for acute pathology.  CT of the abdomen showed signs of possible pyelonephritis however UA is clean.  Low suspicion for pyonephritis due to this and she is not having urinary symptoms.  Patient stated IV fluids along with nausea medications.  On reevaluation she feels much improved.  She is tolerating p.o. intake.  Low suspicion for seizure as  patient did not have any significant tonic-clonic activity and no postictal phase.  Symptoms may be nausea and vomiting related along with a vasovagal type of syncope.  It is also possible she could have a degree of cannabinoid hyperemesis syndrome as she regularly smokes marijuana and last smoked last night.  Obtaining further history from the family does appear patient gets the symptoms randomly throughout the year.  Since she is feeling better and tolerating p.o. intake I believe she stable be discharged home.  Recommend following up with her primary care doctor.  Advised to come back to the ED for any new or worsening symptoms.          Procedure  Procedures     Emanuel Berumen DO  01/17/25 0546

## 2025-02-10 ENCOUNTER — APPOINTMENT (OUTPATIENT)
Dept: PRIMARY CARE | Facility: CLINIC | Age: 65
End: 2025-02-10
Payer: MEDICARE

## 2025-02-10 VITALS — BODY MASS INDEX: 25.23 KG/M2 | HEIGHT: 66 IN | WEIGHT: 157 LBS

## 2025-02-10 DIAGNOSIS — Z12.31 VISIT FOR SCREENING MAMMOGRAM: ICD-10-CM

## 2025-02-10 DIAGNOSIS — E53.8 VITAMIN B12 DEFICIENCY: ICD-10-CM

## 2025-02-10 DIAGNOSIS — Z78.0 POST-MENOPAUSAL: ICD-10-CM

## 2025-02-10 DIAGNOSIS — L98.9 CHANGING SKIN LESION: ICD-10-CM

## 2025-02-10 DIAGNOSIS — E55.9 VITAMIN D DEFICIENCY: ICD-10-CM

## 2025-02-10 DIAGNOSIS — E78.5 DYSLIPIDEMIA: Primary | ICD-10-CM

## 2025-02-10 DIAGNOSIS — D69.6 THROMBOCYTOPENIA (CMS-HCC): ICD-10-CM

## 2025-02-10 DIAGNOSIS — I50.22 CHRONIC SYSTOLIC HEART FAILURE: ICD-10-CM

## 2025-02-10 DIAGNOSIS — J44.9 CHRONIC OBSTRUCTIVE PULMONARY DISEASE, UNSPECIFIED COPD TYPE (MULTI): ICD-10-CM

## 2025-02-10 DIAGNOSIS — E11.9 DIABETES MELLITUS WITHOUT COMPLICATION (MULTI): ICD-10-CM

## 2025-02-10 DIAGNOSIS — J44.1 COPD WITH ACUTE EXACERBATION (MULTI): ICD-10-CM

## 2025-02-10 ASSESSMENT — ENCOUNTER SYMPTOMS
HEADACHES: 0
CHEST TIGHTNESS: 0
CHILLS: 0
JOINT SWELLING: 0
FATIGUE: 0
SLEEP DISTURBANCE: 0
DEPRESSION: 0
POLYDIPSIA: 0
MYALGIAS: 0
BLOOD IN STOOL: 0
NECK PAIN: 0
APPETITE CHANGE: 0
DIARRHEA: 0
SORE THROAT: 0
HEMATURIA: 0
WOUND: 0
CONSTIPATION: 0
PALPITATIONS: 0
SEIZURES: 0
TROUBLE SWALLOWING: 0
FLANK PAIN: 0
FEVER: 0
NAUSEA: 0
SHORTNESS OF BREATH: 0
ARTHRALGIAS: 0
BACK PAIN: 0
ABDOMINAL PAIN: 0
LOSS OF SENSATION IN FEET: 0
COUGH: 0
WHEEZING: 0
DIZZINESS: 0
PHOTOPHOBIA: 0
DYSURIA: 0
UNEXPECTED WEIGHT CHANGE: 0
ACTIVITY CHANGE: 0
VOMITING: 0
OCCASIONAL FEELINGS OF UNSTEADINESS: 0
EYE PAIN: 0
BRUISES/BLEEDS EASILY: 0
CONFUSION: 0

## 2025-02-10 NOTE — PROGRESS NOTES
Subjective   Patient ID: Sophie Cunningham is a 65 y.o. female who presents for Follow-up (Follow up/).  [facility]    Virtual visit today as a follow up appointment. Telephone call at patient request.      Following with Dr. Franklin for Cardiology. Following with [facility]. MI in 2016 with Stent placement. Dyslipidemia, had been taking Simvastatin and Fenofibrate. Discontinued taking, prescribed Pravastatin, but patient has still not started taking medication. Was prescribed Jardiance, also has not started taking medication. Thought that she was taking for her Sugar, and her Sugars have been well controlled.      COPD. Albuterol PRN with Nebulizer solution. Respiratory status at baseline.      Has been diagnosed with Diabetes. Previously was Metformin, not comfortable with taking medication. Has been monitoring blood sugars.  States that her blood sugars are well controlled at home. Due for updated lab work.     Review of Systems   Constitutional:  Negative for activity change, appetite change, chills, fatigue, fever and unexpected weight change.   HENT:  Negative for ear pain, hearing loss, nosebleeds, sore throat, tinnitus and trouble swallowing.    Eyes:  Negative for photophobia, pain and visual disturbance.   Respiratory:  Negative for cough, chest tightness, shortness of breath and wheezing.    Cardiovascular:  Negative for chest pain, palpitations and leg swelling.   Gastrointestinal:  Negative for abdominal pain, blood in stool, constipation, diarrhea, nausea and vomiting.   Endocrine: Negative for cold intolerance, heat intolerance, polydipsia and polyuria.   Genitourinary:  Negative for dysuria, flank pain and hematuria.   Musculoskeletal:  Negative for arthralgias, back pain, joint swelling, myalgias and neck pain.   Skin:  Negative for pallor, rash and wound.   Allergic/Immunologic: Negative for immunocompromised state.   Neurological:  Negative for dizziness, seizures and headaches.   Hematological:  Does  not bruise/bleed easily.   Psychiatric/Behavioral:  Negative for confusion and sleep disturbance.        Objective   Physical Exam    Assessment/Plan        Due for updated lab work. New order provided at [facility] today.        Cardiomyopathy, CAD, Heart Failure, LBBB: Following with Cardiology for management. Stable at this time. Continue to monitor. Encouraged to take medications as prescribed.   Changing Skin Lesion: Dermatology referral. Requesting a new referral here today.   Dyslipidemia: Encouraged to restart medication as prescribed. Updated lab work ordered.   Thrombocytopenia: Updated lab work ordered.   Diabetes: Last A1C 6.7%. Discussed continued lifestyle modifications. Encourage updated eye exams and Podiatry. Updated lab work ordered.   COPD: Respiratory status at baseline. Continue Albuterol.      Medicare Wellness: April 2024.   Declined Colon Cancer Screening.   Mammogram ordered.   Bone Density ordered.   Prevnar: April 2023.   Declined Flu Vaccine.     The patient was interviewed via a secure video link due to the COVID pandemic. The link allowed real-time communication between the patient and the provider. The patient gave permission to perform the clinic visit through this mechanism. We were on the telephone call for approximately 16 minutes. More than 50% of that time was spent in counseling and coordination of care. No physical examination was performed.     Katerine Dewey, SUNI-CNS 02/10/25 2:48 PM

## 2025-02-11 LAB
ATRIAL RATE: 76 BPM
P AXIS: 63 DEGREES
PR INTERVAL: 207 MS
Q ONSET: 256 MS
QRS COUNT: 12 BEATS
QRS DURATION: 164 MS
QT INTERVAL: 410 MS
QTC CALCULATION(BAZETT): 462 MS
QTC FREDERICIA: 443 MS
R AXIS: 222 DEGREES
T AXIS: 79 DEGREES
T OFFSET: 461 MS
VENTRICULAR RATE: 76 BPM

## 2025-03-06 ENCOUNTER — HOSPITAL ENCOUNTER (OUTPATIENT)
Dept: CARDIOLOGY | Facility: HOSPITAL | Age: 65
Discharge: HOME | End: 2025-03-06
Payer: MEDICARE

## 2025-03-06 DIAGNOSIS — Z95.810 PRESENCE OF AUTOMATIC (IMPLANTABLE) CARDIAC DEFIBRILLATOR: Primary | ICD-10-CM

## 2025-03-06 DIAGNOSIS — I42.0 DILATED CARDIOMYOPATHY (MULTI): ICD-10-CM

## 2025-03-06 DIAGNOSIS — Z95.810 PRESENCE OF AUTOMATIC (IMPLANTABLE) CARDIAC DEFIBRILLATOR: ICD-10-CM

## 2025-03-06 PROCEDURE — 93284 PRGRMG EVAL IMPLANTABLE DFB: CPT

## 2025-03-11 ENCOUNTER — APPOINTMENT (OUTPATIENT)
Dept: RADIOLOGY | Facility: CLINIC | Age: 65
End: 2025-03-11
Payer: MEDICARE

## 2025-04-08 ENCOUNTER — APPOINTMENT (OUTPATIENT)
Dept: CARDIOLOGY | Facility: HOSPITAL | Age: 65
End: 2025-04-08
Payer: MEDICARE

## 2025-04-10 ENCOUNTER — HOSPITAL ENCOUNTER (OUTPATIENT)
Dept: CARDIOLOGY | Facility: HOSPITAL | Age: 65
Discharge: HOME | End: 2025-04-10
Payer: MEDICARE

## 2025-04-10 DIAGNOSIS — Z95.810 PRESENCE OF AUTOMATIC (IMPLANTABLE) CARDIAC DEFIBRILLATOR: ICD-10-CM

## 2025-04-10 DIAGNOSIS — I42.0 DILATED CARDIOMYOPATHY (MULTI): ICD-10-CM

## 2025-04-18 ENCOUNTER — TELEPHONE (OUTPATIENT)
Dept: CARDIOLOGY | Facility: HOSPITAL | Age: 65
End: 2025-04-18
Payer: MEDICARE

## 2025-04-18 DIAGNOSIS — I50.22 CHRONIC SYSTOLIC HEART FAILURE: Primary | ICD-10-CM

## 2025-04-18 DIAGNOSIS — I50.22 CHRONIC SYSTOLIC (CONGESTIVE) HEART FAILURE: ICD-10-CM

## 2025-04-18 NOTE — TELEPHONE ENCOUNTER
Patient called office asking to be put on a nausea medication and a water medication. Patient asks for meds to be sent to [facility] in [place].

## 2025-04-18 NOTE — TELEPHONE ENCOUNTER
4/18/25  1320  Returned call to patient who reported having ankle edema and requested to restart her lasix 20 mg daily.    Per Dr. Franklin okay to restart but bmp needed in 2 weeks.    Informed patient of above with patient verbalizing understanding and agreeable to plan.    BMP ordered and lasix 20 mg daily sent for approval.

## 2025-04-19 RX ORDER — FUROSEMIDE 20 MG/1
20 TABLET ORAL DAILY
Qty: 90 TABLET | Refills: 1 | Status: SHIPPED | OUTPATIENT
Start: 2025-04-19 | End: 2026-04-19

## 2025-04-20 DIAGNOSIS — I50.22 CHRONIC SYSTOLIC (CONGESTIVE) HEART FAILURE: ICD-10-CM

## 2025-04-20 DIAGNOSIS — I42.0 DILATED CARDIOMYOPATHY (MULTI): ICD-10-CM

## 2025-04-21 ENCOUNTER — APPOINTMENT (OUTPATIENT)
Dept: CARDIOLOGY | Facility: HOSPITAL | Age: 65
End: 2025-04-21
Payer: MEDICARE

## 2025-04-23 RX ORDER — LISINOPRIL 40 MG/1
40 TABLET ORAL DAILY
Qty: 90 TABLET | Refills: 1 | Status: SHIPPED | OUTPATIENT
Start: 2025-04-23

## 2025-04-24 NOTE — PROGRESS NOTES
"Counseling:  The patient was counseled regarding diagnostic results, instructions for management, risk factor reductions, prognosis, patient and family education, impressions, risks and benefits of treatment options and importance of compliance with treatment.      Chief Complaint:   The patient presents today for 6-month followup of CMP/heart failure, CAD, mitral and tricuspid regurgitation, and dyslipidemia.     History Of Present Illness:    Sophie Cunningham is a 65 year old female patient who presents today for 6-month followup of CMP/heart failure, CAD, mitral and tricuspid regurgitation, and dyslipidemia. Her PMH is significant for CAD s/p PCI of OM1 in 2016 (appears to have presented as STEMI per chart review), nonischemic cardiomyopathy, LBBB, chronic systolic heart failure s/p Bi-V ICD in 2016, hyperlipidemia, prediabetes, thrombocytopenia and nicotine dependence. Over the past 6 months, the patient states that she has done well from a cardiac standpoint. She denies any CP, chest discomfort or SOB. She reports LE edema with prominent veins. The patient also reports that she has been nauseated with vomiting, poor p.o. intake, phlegm production and weight loss. BP has been stable. The patient is compliant with her prescribed medications.       Last Recorded Vitals:  Vitals:    04/25/25 1155   BP: 122/76   BP Location: Left arm   Patient Position: Sitting   BP Cuff Size: Adult   Pulse: 60   SpO2: 97%   Weight: 63.9 kg (140 lb 12.8 oz)   Height: 1.676 m (5' 6\")       Past Surgical History:  She has a past surgical history that includes Cervical biopsy w/ loop electrode excision (06/01/2016); Other surgical history (06/01/2016); Other surgical history (10/18/2016); and Cardiac pacemaker placement (10/12/2016).      Social History:  She reports that she has been smoking cigarettes. She has never used smokeless tobacco. She reports that she does not currently use alcohol. She reports current drug use. Drug: " Marijuana.    Family History:  Family History   Problem Relation Name Age of Onset    Pneumonia Mother      Alcohol abuse Father      Liver disease Father      Thyroid disease Sister      Throat cancer Brother      Coronary artery disease Mother's Brother      Coronary artery disease Maternal Grandfather      Alcohol abuse Paternal Grandfather      Heart attack Other Grandfather         Allergies:  Codeine    Outpatient Medications:  Current Outpatient Medications   Medication Instructions    albuterol 90 mcg/actuation inhaler 1 puff, Every 4 hours PRN    albuterol 2.5 mg, Every 6 hours PRN    aspirin 81 mg EC tablet 1 tablet, Daily    blood sugar diagnostic strip 1 strip, miscellaneous, Daily, Give what is covered by insurance    blood-glucose meter misc 1 each, miscellaneous, Daily, Give what is covered by insurance    carvedilol (COREG) 50 mg, oral, 2 times daily, COREG 25 MG TABEQUIV    empagliflozin (JARDIANCE) 10 mg, oral, Daily    furosemide (LASIX) 20 mg, oral, Daily    lancets misc 1 Lancet, subcutaneous, Daily, Give what is covered by insurance    lisinopril 40 mg, oral, Daily    multivitamin tablet 1 tablet, Daily    nitroglycerin (NITROSTAT) 0.4 mg, Every 5 min PRN    ondansetron ODT (ZOFRAN-ODT) 4 mg, oral, Every 8 hours PRN    pantoprazole (PROTONIX) 40 mg, oral, Daily before breakfast, Protonix 40 mg tab equiv    pravastatin (PRAVACHOL) 10 mg, oral, Daily     Review of Systems   Constitutional: Positive for weight loss.   Cardiovascular:  Positive for leg swelling.   Respiratory:  Positive for sputum production.    Gastrointestinal:  Positive for nausea and vomiting.   All other systems reviewed and are negative.     Physical Exam:  Constitutional:       Appearance: Healthy appearance. Not in distress.   Neck:      Vascular: No JVR. JVD normal.   Pulmonary:      Effort: Pulmonary effort is normal.      Breath sounds: Normal breath sounds. No wheezing. No rhonchi. No rales.   Chest:      Chest wall:  Not tender to palpatation.   Cardiovascular:      PMI at left midclavicular line. Normal rate. Regular rhythm. Normal S1. Normal S2.       Murmurs: There is no murmur.      No gallop.  No click. No rub.   Pulses:     Intact distal pulses.   Edema:     Peripheral edema absent.   Abdominal:      General: Bowel sounds are normal.      Palpations: Abdomen is soft.      Tenderness: There is no abdominal tenderness.   Musculoskeletal: Normal range of motion.         General: No tenderness. Skin:     General: Skin is warm and dry.   Neurological:      General: No focal deficit present.      Mental Status: Alert and oriented to person, place and time.          Last Labs:  CBC -  Lab Results   Component Value Date    WBC 20.8 (H) 01/17/2025    HGB 13.7 01/17/2025    HCT 39.8 01/17/2025    MCV 90 01/17/2025     01/17/2025       CMP -  Lab Results   Component Value Date    CALCIUM 8.7 01/17/2025    PROT 6.4 01/17/2025    ALBUMIN 3.8 01/17/2025    AST 21 01/17/2025    ALT 17 01/17/2025    ALKPHOS 52 01/17/2025    BILITOT 0.7 01/17/2025       LIPID PANEL -   Lab Results   Component Value Date    CHOL 130 04/26/2024    TRIG 193 (H) 04/26/2024    HDL 33.8 04/26/2024    CHHDL 3.8 04/26/2024    LDLF 137 (H) 05/08/2023    VLDL 39 04/26/2024    NHDL 96 04/26/2024       RENAL FUNCTION PANEL -   Lab Results   Component Value Date    GLUCOSE 162 (H) 01/17/2025     01/17/2025    K 3.9 01/17/2025     (H) 01/17/2025    CO2 23 01/17/2025    ANIONGAP 12 01/17/2025    BUN 12 01/17/2025    CREATININE 0.64 01/17/2025    CALCIUM 8.7 01/17/2025    ALBUMIN 3.8 01/17/2025        Lab Results   Component Value Date     (H) 05/26/2024    HGBA1C 6.7 (H) 04/26/2024       Last Cardiology Tests:  02/14/2024 - TTE  1. Left ventricular systolic function is mildly decreased with a 45-50% estimated ejection fraction.  2. Mid inferolateral segment is abnormal.  3. Aneurysmal mid inferolateral wall.  4. Spectral Doppler shows an  impaired relaxation pattern of left ventricular diastolic filling.  5. Moderate tricuspid regurgitation.  6. Mild mitral regurgitation.    12/02/2021 - TTE  1. The left ventricular systolic function is moderately decreased with a 40-45% estimated ejection fraction.  2. Spectral Doppler shows an impaired relaxation pattern of left ventricular diastolic filling.  3. There is moderately reduced right ventricular systolic function.  4. Moderate mitral valve regurgitation.  5. There is moderate to severe tricuspid regurgitation.     06/10/2020 - U/S Abdomen  Ectatic abdominal aorta measuring up to 2.8 cm in caliber, otherwise unremarkable exam.     05/27/2020 - TTE  1. The left ventricular systolic function is moderately decreased with a 40% estimated ejection fraction.  2. Spectral Doppler shows a pseudonormal pattern of left ventricular diastolic filling.  3. RVSP within normal limits.  4. There is moderate tricuspid regurgitation.  5. Aortic valve stenosis is not present.  6. There is global hypokinesis of the left ventricle with minor regional variations.     06/07/2019 - Cardiac Catheterization (LH)  1. Mild non-obstructive coronary artery disease.  2. Left Ventricular end-diastolic pressure = 8.  3. Normal LV filling pressures.      05/20/2019 - NM Cardiac Stress Test  1. Medium-sized area of fixed perfusion defect of the lateral segment, consistent with myocardial scar or hibernating myocardium in left circumflex territory. No ischemia was identified.   2. Mildly reduced left ventricular systolic function on post stress gated imaging.      05/20/2019 - Regadenoson Stress Test   Nondiagnostic secondary to a ventricular paced rhythm.      05/20/2019 - Echocardiogram   1. Moderate left ventricular systolic dysfunction with regional abnormalities with an ejection fraction of 35%.  2. Akinetic inferolateral segments.  3. Hypokinetic basal/mid anterolateral segment.   4. Normal right ventricular size and systolic  function.   5. Mild mitral regurgitation.  6. Moderate tricuspid regurgitation.     02/21/2018 - TTE  1. The left ventricular systolic function is mildly decreased with a 45% estimated ejection fraction.  2. Abnormal septal motion consistent with RV pacemaker.  3. Spectral Doppler shows an impaired relaxation pattern of left ventricular diastolic filling.     Lab review: I have personally reviewed the laboratory result(s).    Assessment/Plan   1) Ischemic Cardiomyopathy/Chronic Systolic Heart Failure s/p Bi-V ICD in 2016 - LBBB  On ASA 81 mg daily, carvedilol 50 mg BID, furosemide 20 mg daily, lisinopril 40 mg daily  Patient discontinued Jardiance because she does not have diabetes   Compensated NYHA Class 2  TTE 02/14/2024 with LVEF 45-50%, aneurysmal mid inferolateral wall  Denies CP, chest discomfort or SOB  Reports LE edema - likely s/t CVI as patient also has prominent veins  BP stable   Continue current medical Rx   Repeat echo 6 months  Followup with Leigha Castaneda NP, in 6 months    2) CAD s/p STEMI s/p PCI OM1 2016, Mitral and Tricuspid Regurgitation   On ASA 81 mg daily, carvedilol 50 mg BID, furosemide 20 mg daily, lisinopril 40 mg daily  TTE 02/14/2024 with LVEF 45-50%, aneurysmal mid inferolateral wall, moderate tricuspid regurgitation, mild mitral regurgitation.  Denies CP, chest discomfort or SOB  BP stable  Continue current medical Rx   Repeat echo 6 months  Followup with Leigha Castaneda NP, in 6 months    3) Dyslipidemia  Goal LDL <70  Intolerant of statins s/t myalgias   Lipid panel 04/26/2024 with LDL of 58  Followup with Leigha Castaneda NP, in 6 months     4) Smoking  Patient continues to smoke - reduced quantity   Continues to decline any assistance      5) Exophytic Growth on GB  Per patient, she was seen by Dr. Solano and no further intervention required regarding gallbladder  Dr. Solano did recommend EGD/ screening colonoscopy, but she declined to have this done  Educated that screening  colonoscopy is recommended    6) Prediabetes  Per PCP    7) Nausea, Vomiting  Reports nausea and vomiting  Reports poor p.o. intake and weight loss  ED evaluation 01/2025 with ?seizure activity  WBC count elevated at 20.8  Urinalysis positive   Referral placed to Dr. Lazo, GI         Scribe Attestation  By signing my name below, I, Nicholas Rodrigues   attest that this documentation has been prepared under the direction and in the presence of Alex Franklin MD.

## 2025-04-25 ENCOUNTER — OFFICE VISIT (OUTPATIENT)
Dept: CARDIOLOGY | Facility: HOSPITAL | Age: 65
End: 2025-04-25
Payer: MEDICARE

## 2025-04-25 VITALS
WEIGHT: 140.8 LBS | DIASTOLIC BLOOD PRESSURE: 76 MMHG | SYSTOLIC BLOOD PRESSURE: 122 MMHG | BODY MASS INDEX: 22.63 KG/M2 | OXYGEN SATURATION: 97 % | HEART RATE: 60 BPM | HEIGHT: 66 IN

## 2025-04-25 DIAGNOSIS — I50.22 CHRONIC SYSTOLIC HEART FAILURE: ICD-10-CM

## 2025-04-25 DIAGNOSIS — I25.10 CORONARY ARTERY DISEASE INVOLVING NATIVE CORONARY ARTERY OF NATIVE HEART WITHOUT ANGINA PECTORIS: ICD-10-CM

## 2025-04-25 DIAGNOSIS — E78.5 DYSLIPIDEMIA: ICD-10-CM

## 2025-04-25 PROCEDURE — 1123F ACP DISCUSS/DSCN MKR DOCD: CPT | Performed by: INTERNAL MEDICINE

## 2025-04-25 PROCEDURE — 3074F SYST BP LT 130 MM HG: CPT | Performed by: INTERNAL MEDICINE

## 2025-04-25 PROCEDURE — 1160F RVW MEDS BY RX/DR IN RCRD: CPT | Performed by: INTERNAL MEDICINE

## 2025-04-25 PROCEDURE — 3078F DIAST BP <80 MM HG: CPT | Performed by: INTERNAL MEDICINE

## 2025-04-25 PROCEDURE — 99213 OFFICE O/P EST LOW 20 MIN: CPT | Performed by: INTERNAL MEDICINE

## 2025-04-25 PROCEDURE — 4010F ACE/ARB THERAPY RXD/TAKEN: CPT | Performed by: INTERNAL MEDICINE

## 2025-04-25 PROCEDURE — 1159F MED LIST DOCD IN RCRD: CPT | Performed by: INTERNAL MEDICINE

## 2025-04-25 PROCEDURE — 3008F BODY MASS INDEX DOCD: CPT | Performed by: INTERNAL MEDICINE

## 2025-04-25 ASSESSMENT — ENCOUNTER SYMPTOMS
NAUSEA: 1
VOMITING: 1
SPUTUM PRODUCTION: 1
WEIGHT LOSS: 1

## 2025-04-25 NOTE — PATIENT INSTRUCTIONS
Continue all current medications as prescribed.  Please have the blood work drawn as previously ordered by your primary care provider.   Dr. Franklin has placed a referral to Dr. Lazo, gastroenterologist.   Repeat echocardiogram (ultrasound of the heart) in 6 months to followup on your heart function and structure.   Followup with Leigha Castaneda NP, in 6 months.      If you have any questions or cardiac concerns, please call our office at [phone].

## 2025-04-25 NOTE — LETTER
April 25, 2025     [facility]ine Dewey, [facility]  [address]  [facility] [address]    Patient: Sophie Cunningham   YOB: 1960   Date of Visit: 4/25/2025       Dear Dr. Katerine Dewey, SUNIJORI:    Thank you for referring Sophie Cunningham to me for evaluation. Below are my notes for this consultation.  If you have questions, please do not hesitate to call me. I look forward to following your patient along with you.       Sincerely,     Alex Franklin MD      CC: No Recipients  ______________________________________________________________________________________    Counseling:  The patient was counseled regarding diagnostic results, instructions for management, risk factor reductions, prognosis, patient and family education, impressions, risks and benefits of treatment options and importance of compliance with treatment.      Chief Complaint:   The patient presents today for 6-month followup of CMP/heart failure, CAD, mitral and tricuspid regurgitation, and dyslipidemia.     History Of Present Illness:    Sophie Cunningham is a 65 year old female patient who presents today for 6-month followup of CMP/heart failure, CAD, mitral and tricuspid regurgitation, and dyslipidemia. Her PMH is significant for CAD s/p PCI of OM1 in 2016 (appears to have presented as STEMI per chart review), nonischemic cardiomyopathy, LBBB, chronic systolic heart failure s/p Bi-V ICD in 2016, hyperlipidemia, prediabetes, thrombocytopenia and nicotine dependence. Over the past 6 months, the patient states that she has done well from a cardiac standpoint. She denies any CP, chest discomfort or SOB. She reports LE edema with prominent veins. The patient also reports that she has been nauseated with vomiting, poor p.o. intake, phlegm production and weight loss. BP has been stable. The patient is compliant with her prescribed medications.       Last Recorded Vitals:  Vitals:    04/25/25 1155   BP: 122/76   BP  "Location: Left arm   Patient Position: Sitting   BP Cuff Size: Adult   Pulse: 60   SpO2: 97%   Weight: 63.9 kg (140 lb 12.8 oz)   Height: 1.676 m (5' 6\")       Past Surgical History:  She has a past surgical history that includes Cervical biopsy w/ loop electrode excision (06/01/2016); Other surgical history (06/01/2016); Other surgical history (10/18/2016); and Cardiac pacemaker placement (10/12/2016).      Social History:  She reports that she has been smoking cigarettes. She has never used smokeless tobacco. She reports that she does not currently use alcohol. She reports current drug use. Drug: Marijuana.    Family History:  Family History   Problem Relation Name Age of Onset   • Pneumonia Mother     • Alcohol abuse Father     • Liver disease Father     • Thyroid disease Sister     • Throat cancer Brother     • Coronary artery disease Mother's Brother     • Coronary artery disease Maternal Grandfather     • Alcohol abuse Paternal Grandfather     • Heart attack Other Grandfather         Allergies:  Codeine    Outpatient Medications:  Current Outpatient Medications   Medication Instructions   • albuterol 90 mcg/actuation inhaler 1 puff, Every 4 hours PRN   • albuterol 2.5 mg, Every 6 hours PRN   • aspirin 81 mg EC tablet 1 tablet, Daily   • blood sugar diagnostic strip 1 strip, miscellaneous, Daily, Give what is covered by insurance   • blood-glucose meter misc 1 each, miscellaneous, Daily, Give what is covered by insurance   • carvedilol (COREG) 50 mg, oral, 2 times daily, COREG 25 MG TABEQUIV   • empagliflozin (JARDIANCE) 10 mg, oral, Daily   • furosemide (LASIX) 20 mg, oral, Daily   • lancets misc 1 Lancet, subcutaneous, Daily, Give what is covered by insurance   • lisinopril 40 mg, oral, Daily   • multivitamin tablet 1 tablet, Daily   • nitroglycerin (NITROSTAT) 0.4 mg, Every 5 min PRN   • ondansetron ODT (ZOFRAN-ODT) 4 mg, oral, Every 8 hours PRN   • pantoprazole (PROTONIX) 40 mg, oral, Daily before " breakfast, Protonix 40 mg tab equiv   • pravastatin (PRAVACHOL) 10 mg, oral, Daily     Review of Systems   Constitutional: Positive for weight loss.   Cardiovascular:  Positive for leg swelling.   Respiratory:  Positive for sputum production.    Gastrointestinal:  Positive for nausea and vomiting.   All other systems reviewed and are negative.     Physical Exam:  Constitutional:       Appearance: Healthy appearance. Not in distress.   Neck:      Vascular: No JVR. JVD normal.   Pulmonary:      Effort: Pulmonary effort is normal.      Breath sounds: Normal breath sounds. No wheezing. No rhonchi. No rales.   Chest:      Chest wall: Not tender to palpatation.   Cardiovascular:      PMI at left midclavicular line. Normal rate. Regular rhythm. Normal S1. Normal S2.       Murmurs: There is no murmur.      No gallop.  No click. No rub.   Pulses:     Intact distal pulses.   Edema:     Peripheral edema absent.   Abdominal:      General: Bowel sounds are normal.      Palpations: Abdomen is soft.      Tenderness: There is no abdominal tenderness.   Musculoskeletal: Normal range of motion.         General: No tenderness. Skin:     General: Skin is warm and dry.   Neurological:      General: No focal deficit present.      Mental Status: Alert and oriented to person, place and time.          Last Labs:  CBC -  Lab Results   Component Value Date    WBC 20.8 (H) 01/17/2025    HGB 13.7 01/17/2025    HCT 39.8 01/17/2025    MCV 90 01/17/2025     01/17/2025       CMP -  Lab Results   Component Value Date    CALCIUM 8.7 01/17/2025    PROT 6.4 01/17/2025    ALBUMIN 3.8 01/17/2025    AST 21 01/17/2025    ALT 17 01/17/2025    ALKPHOS 52 01/17/2025    BILITOT 0.7 01/17/2025       LIPID PANEL -   Lab Results   Component Value Date    CHOL 130 04/26/2024    TRIG 193 (H) 04/26/2024    HDL 33.8 04/26/2024    CHHDL 3.8 04/26/2024    LDLF 137 (H) 05/08/2023    VLDL 39 04/26/2024    NHDL 96 04/26/2024       RENAL FUNCTION PANEL -   Lab  Results   Component Value Date    GLUCOSE 162 (H) 01/17/2025     01/17/2025    K 3.9 01/17/2025     (H) 01/17/2025    CO2 23 01/17/2025    ANIONGAP 12 01/17/2025    BUN 12 01/17/2025    CREATININE 0.64 01/17/2025    CALCIUM 8.7 01/17/2025    ALBUMIN 3.8 01/17/2025        Lab Results   Component Value Date     (H) 05/26/2024    HGBA1C 6.7 (H) 04/26/2024       Last Cardiology Tests:  02/14/2024 - TTE  1. Left ventricular systolic function is mildly decreased with a 45-50% estimated ejection fraction.  2. Mid inferolateral segment is abnormal.  3. Aneurysmal mid inferolateral wall.  4. Spectral Doppler shows an impaired relaxation pattern of left ventricular diastolic filling.  5. Moderate tricuspid regurgitation.  6. Mild mitral regurgitation.    12/02/2021 - TTE  1. The left ventricular systolic function is moderately decreased with a 40-45% estimated ejection fraction.  2. Spectral Doppler shows an impaired relaxation pattern of left ventricular diastolic filling.  3. There is moderately reduced right ventricular systolic function.  4. Moderate mitral valve regurgitation.  5. There is moderate to severe tricuspid regurgitation.     06/10/2020 - U/S Abdomen  Ectatic abdominal aorta measuring up to 2.8 cm in caliber, otherwise unremarkable exam.     05/27/2020 - TTE  1. The left ventricular systolic function is moderately decreased with a 40% estimated ejection fraction.  2. Spectral Doppler shows a pseudonormal pattern of left ventricular diastolic filling.  3. RVSP within normal limits.  4. There is moderate tricuspid regurgitation.  5. Aortic valve stenosis is not present.  6. There is global hypokinesis of the left ventricle with minor regional variations.     06/07/2019 - Cardiac Catheterization (LH)  1. Mild non-obstructive coronary artery disease.  2. Left Ventricular end-diastolic pressure = 8.  3. Normal LV filling pressures.      05/20/2019 - NM Cardiac Stress Test  1. Medium-sized area of  fixed perfusion defect of the lateral segment, consistent with myocardial scar or hibernating myocardium in left circumflex territory. No ischemia was identified.   2. Mildly reduced left ventricular systolic function on post stress gated imaging.      05/20/2019 - Regadenoson Stress Test   Nondiagnostic secondary to a ventricular paced rhythm.      05/20/2019 - Echocardiogram   1. Moderate left ventricular systolic dysfunction with regional abnormalities with an ejection fraction of 35%.  2. Akinetic inferolateral segments.  3. Hypokinetic basal/mid anterolateral segment.   4. Normal right ventricular size and systolic function.   5. Mild mitral regurgitation.  6. Moderate tricuspid regurgitation.     02/21/2018 - TTE  1. The left ventricular systolic function is mildly decreased with a 45% estimated ejection fraction.  2. Abnormal septal motion consistent with RV pacemaker.  3. Spectral Doppler shows an impaired relaxation pattern of left ventricular diastolic filling.     Lab review: I have personally reviewed the laboratory result(s).    Assessment/Plan  1) Ischemic Cardiomyopathy/Chronic Systolic Heart Failure s/p Bi-V ICD in 2016 - LBBB  On ASA 81 mg daily, carvedilol 50 mg BID, furosemide 20 mg daily, lisinopril 40 mg daily  Patient discontinued Jardiance because she does not have diabetes   Compensated NYHA Class 2  TTE 02/14/2024 with LVEF 45-50%, aneurysmal mid inferolateral wall  Denies CP, chest discomfort or SOB  Reports LE edema - likely s/t CVI as patient also has prominent veins  BP stable   Continue current medical Rx   Repeat echo 6 months  Followup with Leigha Castaneda NP, in 6 months    2) CAD s/p STEMI s/p PCI OM1 2016, Mitral and Tricuspid Regurgitation   On ASA 81 mg daily, carvedilol 50 mg BID, furosemide 20 mg daily, lisinopril 40 mg daily  TTE 02/14/2024 with LVEF 45-50%, aneurysmal mid inferolateral wall, moderate tricuspid regurgitation, mild mitral regurgitation.  Denies CP, chest  discomfort or SOB  BP stable  Continue current medical Rx   Repeat echo 6 months  Followup with Leigha Castaneda NP, in 6 months    3) Dyslipidemia  Goal LDL <70  Intolerant of statins s/t myalgias   Lipid panel 04/26/2024 with LDL of 58  Followup with Leigha Castaneda NP, in 6 months     4) Smoking  Patient continues to smoke - reduced quantity   Continues to decline any assistance      5) Exophytic Growth on GB  Per patient, she was seen by Dr. Solano and no further intervention required regarding gallbladder  Dr. Solano did recommend EGD/ screening colonoscopy, but she declined to have this done  Educated that screening colonoscopy is recommended    6) Prediabetes  Per PCP    7) Nausea, Vomiting  Reports nausea and vomiting  Reports poor p.o. intake and weight loss  ED evaluation 01/2025 with ?seizure activity  WBC count elevated at 20.8  Urinalysis positive   Referral placed to Dr. Lazo, GI         Scribe Attestation  By signing my name below, I, Nicholas Rodrigues   attest that this documentation has been prepared under the direction and in the presence of Alex Franklin MD.

## 2025-04-29 ENCOUNTER — APPOINTMENT (OUTPATIENT)
Dept: CARDIOLOGY | Facility: HOSPITAL | Age: 65
End: 2025-04-29
Payer: MEDICARE

## 2025-05-05 ENCOUNTER — APPOINTMENT (OUTPATIENT)
Dept: CARDIOLOGY | Facility: HOSPITAL | Age: 65
End: 2025-05-05
Payer: MEDICARE

## 2025-05-13 ENCOUNTER — APPOINTMENT (OUTPATIENT)
Dept: PRIMARY CARE | Facility: CLINIC | Age: 65
End: 2025-05-13
Payer: MEDICARE

## 2025-07-03 ENCOUNTER — HOSPITAL ENCOUNTER (OUTPATIENT)
Dept: CARDIOLOGY | Facility: HOSPITAL | Age: 65
Discharge: HOME | End: 2025-07-03
Payer: MEDICARE

## 2025-07-03 DIAGNOSIS — I42.0 DILATED CARDIOMYOPATHY (MULTI): ICD-10-CM

## 2025-07-03 DIAGNOSIS — Z95.810 PRESENCE OF AUTOMATIC (IMPLANTABLE) CARDIAC DEFIBRILLATOR: ICD-10-CM

## 2025-07-03 PROCEDURE — 93284 PRGRMG EVAL IMPLANTABLE DFB: CPT

## 2025-07-23 DIAGNOSIS — I42.0 DILATED CARDIOMYOPATHY (MULTI): ICD-10-CM

## 2025-07-23 DIAGNOSIS — I50.22 CHRONIC SYSTOLIC (CONGESTIVE) HEART FAILURE: ICD-10-CM

## 2025-07-24 RX ORDER — CARVEDILOL 25 MG/1
50 TABLET ORAL 2 TIMES DAILY
Qty: 360 TABLET | Refills: 3 | Status: SHIPPED | OUTPATIENT
Start: 2025-07-24

## 2025-08-13 ENCOUNTER — HOSPITAL ENCOUNTER (OUTPATIENT)
Dept: CARDIOLOGY | Facility: HOSPITAL | Age: 65
Discharge: HOME | End: 2025-08-13
Payer: MEDICARE

## 2025-08-13 DIAGNOSIS — I42.0 DILATED CARDIOMYOPATHY (MULTI): ICD-10-CM

## 2025-08-13 DIAGNOSIS — Z95.810 PRESENCE OF AUTOMATIC (IMPLANTABLE) CARDIAC DEFIBRILLATOR: ICD-10-CM

## 2025-08-27 ENCOUNTER — APPOINTMENT (OUTPATIENT)
Dept: PRIMARY CARE | Facility: CLINIC | Age: 65
End: 2025-08-27
Payer: MEDICARE

## 2025-08-27 VITALS
HEIGHT: 66 IN | BODY MASS INDEX: 20.99 KG/M2 | SYSTOLIC BLOOD PRESSURE: 115 MMHG | HEART RATE: 64 BPM | DIASTOLIC BLOOD PRESSURE: 61 MMHG | WEIGHT: 130.6 LBS | OXYGEN SATURATION: 97 %

## 2025-08-27 DIAGNOSIS — R73.03 PREDIABETES: ICD-10-CM

## 2025-08-27 DIAGNOSIS — J44.9 CHRONIC OBSTRUCTIVE PULMONARY DISEASE, UNSPECIFIED COPD TYPE (MULTI): ICD-10-CM

## 2025-08-27 DIAGNOSIS — E78.5 DYSLIPIDEMIA: ICD-10-CM

## 2025-08-27 DIAGNOSIS — R82.90 ABNORMAL URINE: Primary | ICD-10-CM

## 2025-08-27 DIAGNOSIS — E11.9 DIABETES MELLITUS WITHOUT COMPLICATION: ICD-10-CM

## 2025-08-27 DIAGNOSIS — Z00.00 MEDICARE ANNUAL WELLNESS VISIT, SUBSEQUENT: ICD-10-CM

## 2025-08-27 DIAGNOSIS — J44.1 COPD WITH ACUTE EXACERBATION (MULTI): ICD-10-CM

## 2025-08-27 DIAGNOSIS — I50.22 CHRONIC SYSTOLIC HEART FAILURE: ICD-10-CM

## 2025-08-27 DIAGNOSIS — D69.6 THROMBOCYTOPENIA: ICD-10-CM

## 2025-08-27 DIAGNOSIS — E53.8 VITAMIN B12 DEFICIENCY: ICD-10-CM

## 2025-08-27 DIAGNOSIS — Z78.0 POST-MENOPAUSAL: ICD-10-CM

## 2025-08-27 DIAGNOSIS — E55.9 VITAMIN D DEFICIENCY: ICD-10-CM

## 2025-08-27 DIAGNOSIS — Z12.31 VISIT FOR SCREENING MAMMOGRAM: ICD-10-CM

## 2025-08-27 DIAGNOSIS — L98.9 CHANGING SKIN LESION: ICD-10-CM

## 2025-08-27 LAB
POC APPEARANCE, URINE: CLEAR
POC BILIRUBIN, URINE: NEGATIVE
POC BLOOD, URINE: ABNORMAL
POC COLOR, URINE: YELLOW
POC GLUCOSE, URINE: NEGATIVE MG/DL
POC KETONES, URINE: NEGATIVE MG/DL
POC LEUKOCYTES, URINE: ABNORMAL
POC NITRITE,URINE: NEGATIVE
POC PH, URINE: 6 PH
POC PROTEIN, URINE: NEGATIVE MG/DL
POC SPECIFIC GRAVITY, URINE: <=1.005
POC UROBILINOGEN, URINE: 0.2 EU/DL

## 2025-08-27 PROCEDURE — 3078F DIAST BP <80 MM HG: CPT | Performed by: CLINICAL NURSE SPECIALIST

## 2025-08-27 PROCEDURE — 1160F RVW MEDS BY RX/DR IN RCRD: CPT | Performed by: CLINICAL NURSE SPECIALIST

## 2025-08-27 PROCEDURE — 81002 URINALYSIS NONAUTO W/O SCOPE: CPT | Performed by: CLINICAL NURSE SPECIALIST

## 2025-08-27 PROCEDURE — 3074F SYST BP LT 130 MM HG: CPT | Performed by: CLINICAL NURSE SPECIALIST

## 2025-08-27 PROCEDURE — 1159F MED LIST DOCD IN RCRD: CPT | Performed by: CLINICAL NURSE SPECIALIST

## 2025-08-27 PROCEDURE — 4010F ACE/ARB THERAPY RXD/TAKEN: CPT | Performed by: CLINICAL NURSE SPECIALIST

## 2025-08-27 PROCEDURE — 1124F ACP DISCUSS-NO DSCNMKR DOCD: CPT | Performed by: CLINICAL NURSE SPECIALIST

## 2025-08-27 PROCEDURE — 1170F FXNL STATUS ASSESSED: CPT | Performed by: CLINICAL NURSE SPECIALIST

## 2025-08-27 PROCEDURE — G0444 DEPRESSION SCREEN ANNUAL: HCPCS | Performed by: CLINICAL NURSE SPECIALIST

## 2025-08-27 PROCEDURE — 1125F AMNT PAIN NOTED PAIN PRSNT: CPT | Performed by: CLINICAL NURSE SPECIALIST

## 2025-08-27 PROCEDURE — 4004F PT TOBACCO SCREEN RCVD TLK: CPT | Performed by: CLINICAL NURSE SPECIALIST

## 2025-08-27 PROCEDURE — 3008F BODY MASS INDEX DOCD: CPT | Performed by: CLINICAL NURSE SPECIALIST

## 2025-08-27 PROCEDURE — G0439 PPPS, SUBSEQ VISIT: HCPCS | Performed by: CLINICAL NURSE SPECIALIST

## 2025-08-27 PROCEDURE — 99214 OFFICE O/P EST MOD 30 MIN: CPT | Performed by: CLINICAL NURSE SPECIALIST

## 2025-08-27 RX ORDER — DEXTROSE 4 G
1 TABLET,CHEWABLE ORAL DAILY
Qty: 1 EACH | Refills: 0 | Status: SHIPPED | OUTPATIENT
Start: 2025-08-27

## 2025-08-27 RX ORDER — LANCETS
1 EACH MISCELLANEOUS DAILY
Qty: 100 EACH | Refills: 3 | Status: SHIPPED | OUTPATIENT
Start: 2025-08-27

## 2025-08-27 ASSESSMENT — ENCOUNTER SYMPTOMS
SLEEP DISTURBANCE: 0
PHOTOPHOBIA: 0
FEVER: 0
WOUND: 0
FLANK PAIN: 0
BACK PAIN: 0
DIZZINESS: 0
CHILLS: 0
SORE THROAT: 0
HEADACHES: 0
NECK PAIN: 0
CONFUSION: 0
ACTIVITY CHANGE: 0
WHEEZING: 0
JOINT SWELLING: 0
OCCASIONAL FEELINGS OF UNSTEADINESS: 0
FATIGUE: 0
NAUSEA: 0
APPETITE CHANGE: 0
EYE PAIN: 0
HEMATURIA: 0
ABDOMINAL PAIN: 0
COUGH: 0
ARTHRALGIAS: 0
POLYDIPSIA: 0
LOSS OF SENSATION IN FEET: 0
TROUBLE SWALLOWING: 0
BLOOD IN STOOL: 0
UNEXPECTED WEIGHT CHANGE: 0
DIARRHEA: 0
DYSURIA: 0
VOMITING: 0
SEIZURES: 0
CONSTIPATION: 0
BRUISES/BLEEDS EASILY: 0
CHEST TIGHTNESS: 0
DEPRESSION: 0
SHORTNESS OF BREATH: 0
PALPITATIONS: 0
MYALGIAS: 0

## 2025-08-27 ASSESSMENT — ACTIVITIES OF DAILY LIVING (ADL)
MANAGING_FINANCES: INDEPENDENT
GROCERY_SHOPPING: INDEPENDENT
DOING_HOUSEWORK: INDEPENDENT
TAKING_MEDICATION: INDEPENDENT
DRESSING: INDEPENDENT
BATHING: INDEPENDENT

## 2025-08-27 ASSESSMENT — PATIENT HEALTH QUESTIONNAIRE - PHQ9
1. LITTLE INTEREST OR PLEASURE IN DOING THINGS: NOT AT ALL
SUM OF ALL RESPONSES TO PHQ9 QUESTIONS 1 AND 2: 0
2. FEELING DOWN, DEPRESSED OR HOPELESS: NOT AT ALL

## 2025-08-27 ASSESSMENT — PAIN SCALES - GENERAL: PAINLEVEL_OUTOF10: 7

## 2025-08-29 LAB — BACTERIA UR CULT: ABNORMAL

## 2025-09-01 ENCOUNTER — RESULTS FOLLOW-UP (OUTPATIENT)
Dept: PRIMARY CARE | Facility: CLINIC | Age: 65
End: 2025-09-01
Payer: MEDICARE

## 2025-09-01 DIAGNOSIS — N39.0 URINARY TRACT INFECTION WITHOUT HEMATURIA, SITE UNSPECIFIED: Primary | ICD-10-CM

## 2025-09-01 RX ORDER — CIPROFLOXACIN 500 MG/1
500 TABLET, FILM COATED ORAL 2 TIMES DAILY
Qty: 10 TABLET | Refills: 0 | Status: SHIPPED | OUTPATIENT
Start: 2025-09-01 | End: 2025-09-06

## 2025-09-03 ENCOUNTER — HOSPITAL ENCOUNTER (OUTPATIENT)
Dept: CARDIOLOGY | Facility: HOSPITAL | Age: 65
Discharge: HOME | End: 2025-09-03
Payer: MEDICARE

## 2025-09-03 DIAGNOSIS — Z95.810 PRESENCE OF AUTOMATIC (IMPLANTABLE) CARDIAC DEFIBRILLATOR: ICD-10-CM

## 2025-09-03 DIAGNOSIS — I42.0 DILATED CARDIOMYOPATHY (MULTI): ICD-10-CM

## 2025-09-04 RX ORDER — SULFAMETHOXAZOLE AND TRIMETHOPRIM 800; 160 MG/1; MG/1
1 TABLET ORAL 2 TIMES DAILY
Qty: 6 TABLET | Refills: 0 | Status: SHIPPED | OUTPATIENT
Start: 2025-09-04 | End: 2025-09-07

## 2026-03-12 ENCOUNTER — APPOINTMENT (OUTPATIENT)
Dept: PRIMARY CARE | Facility: CLINIC | Age: 66
End: 2026-03-12
Payer: MEDICARE